# Patient Record
Sex: FEMALE | Race: WHITE | NOT HISPANIC OR LATINO | Employment: UNEMPLOYED | RURAL
[De-identification: names, ages, dates, MRNs, and addresses within clinical notes are randomized per-mention and may not be internally consistent; named-entity substitution may affect disease eponyms.]

---

## 2021-09-08 ENCOUNTER — OFFICE VISIT (OUTPATIENT)
Dept: FAMILY MEDICINE | Facility: CLINIC | Age: 26
End: 2021-09-08
Payer: COMMERCIAL

## 2021-09-08 VITALS
TEMPERATURE: 97 F | SYSTOLIC BLOOD PRESSURE: 104 MMHG | HEART RATE: 61 BPM | OXYGEN SATURATION: 100 % | HEIGHT: 60 IN | WEIGHT: 124 LBS | DIASTOLIC BLOOD PRESSURE: 70 MMHG | BODY MASS INDEX: 24.35 KG/M2

## 2021-09-08 DIAGNOSIS — F32.A DEPRESSION, UNSPECIFIED DEPRESSION TYPE: Primary | ICD-10-CM

## 2021-09-08 DIAGNOSIS — F41.9 ANXIETY: ICD-10-CM

## 2021-09-08 PROCEDURE — 1160F RVW MEDS BY RX/DR IN RCRD: CPT | Mod: ,,, | Performed by: NURSE PRACTITIONER

## 2021-09-08 PROCEDURE — 3074F SYST BP LT 130 MM HG: CPT | Mod: ,,, | Performed by: NURSE PRACTITIONER

## 2021-09-08 PROCEDURE — 3074F PR MOST RECENT SYSTOLIC BLOOD PRESSURE < 130 MM HG: ICD-10-PCS | Mod: ,,, | Performed by: NURSE PRACTITIONER

## 2021-09-08 PROCEDURE — 1159F MED LIST DOCD IN RCRD: CPT | Mod: ,,, | Performed by: NURSE PRACTITIONER

## 2021-09-08 PROCEDURE — 99213 PR OFFICE/OUTPT VISIT, EST, LEVL III, 20-29 MIN: ICD-10-PCS | Mod: ,,, | Performed by: NURSE PRACTITIONER

## 2021-09-08 PROCEDURE — 3008F PR BODY MASS INDEX (BMI) DOCUMENTED: ICD-10-PCS | Mod: ,,, | Performed by: NURSE PRACTITIONER

## 2021-09-08 PROCEDURE — 99213 OFFICE O/P EST LOW 20 MIN: CPT | Mod: ,,, | Performed by: NURSE PRACTITIONER

## 2021-09-08 PROCEDURE — 1159F PR MEDICATION LIST DOCUMENTED IN MEDICAL RECORD: ICD-10-PCS | Mod: ,,, | Performed by: NURSE PRACTITIONER

## 2021-09-08 PROCEDURE — 3078F DIAST BP <80 MM HG: CPT | Mod: ,,, | Performed by: NURSE PRACTITIONER

## 2021-09-08 PROCEDURE — 1160F PR REVIEW ALL MEDS BY PRESCRIBER/CLIN PHARMACIST DOCUMENTED: ICD-10-PCS | Mod: ,,, | Performed by: NURSE PRACTITIONER

## 2021-09-08 PROCEDURE — 3008F BODY MASS INDEX DOCD: CPT | Mod: ,,, | Performed by: NURSE PRACTITIONER

## 2021-09-08 PROCEDURE — 3078F PR MOST RECENT DIASTOLIC BLOOD PRESSURE < 80 MM HG: ICD-10-PCS | Mod: ,,, | Performed by: NURSE PRACTITIONER

## 2021-09-08 RX ORDER — SERTRALINE HYDROCHLORIDE 50 MG/1
50 TABLET, FILM COATED ORAL DAILY
COMMUNITY
Start: 2021-08-11 | End: 2021-09-08 | Stop reason: SDUPTHER

## 2021-09-08 RX ORDER — SERTRALINE HYDROCHLORIDE 50 MG/1
50 TABLET, FILM COATED ORAL DAILY
Qty: 90 TABLET | Refills: 1 | Status: SHIPPED | OUTPATIENT
Start: 2021-09-08 | End: 2022-03-08 | Stop reason: SDUPTHER

## 2021-10-27 ENCOUNTER — OFFICE VISIT (OUTPATIENT)
Dept: FAMILY MEDICINE | Facility: CLINIC | Age: 26
End: 2021-10-27
Payer: COMMERCIAL

## 2021-10-27 VITALS
TEMPERATURE: 97 F | OXYGEN SATURATION: 100 % | WEIGHT: 128 LBS | BODY MASS INDEX: 25.13 KG/M2 | SYSTOLIC BLOOD PRESSURE: 102 MMHG | HEART RATE: 62 BPM | HEIGHT: 60 IN | DIASTOLIC BLOOD PRESSURE: 64 MMHG

## 2021-10-27 DIAGNOSIS — S40.261A: Primary | ICD-10-CM

## 2021-10-27 DIAGNOSIS — L29.9 ITCHING: ICD-10-CM
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
## 2021-10-27 DIAGNOSIS — S40.861A: Primary | ICD-10-CM

## 2021-10-27 DIAGNOSIS — L08.9: Primary | ICD-10-CM

## 2021-10-27 PROCEDURE — 1160F PR REVIEW ALL MEDS BY PRESCRIBER/CLIN PHARMACIST DOCUMENTED: ICD-10-PCS | Mod: ,,, | Performed by: NURSE PRACTITIONER

## 2021-10-27 PROCEDURE — 1159F MED LIST DOCD IN RCRD: CPT | Mod: ,,, | Performed by: NURSE PRACTITIONER

## 2021-10-27 PROCEDURE — 3078F PR MOST RECENT DIASTOLIC BLOOD PRESSURE < 80 MM HG: ICD-10-PCS | Mod: ,,, | Performed by: NURSE PRACTITIONER

## 2021-10-27 PROCEDURE — 3074F SYST BP LT 130 MM HG: CPT | Mod: ,,, | Performed by: NURSE PRACTITIONER

## 2021-10-27 PROCEDURE — 3078F DIAST BP <80 MM HG: CPT | Mod: ,,, | Performed by: NURSE PRACTITIONER

## 2021-10-27 PROCEDURE — 99213 PR OFFICE/OUTPT VISIT, EST, LEVL III, 20-29 MIN: ICD-10-PCS | Mod: ,,, | Performed by: NURSE PRACTITIONER

## 2021-10-27 PROCEDURE — 1159F PR MEDICATION LIST DOCUMENTED IN MEDICAL RECORD: ICD-10-PCS | Mod: ,,, | Performed by: NURSE PRACTITIONER

## 2021-10-27 PROCEDURE — 3008F BODY MASS INDEX DOCD: CPT | Mod: ,,, | Performed by: NURSE PRACTITIONER

## 2021-10-27 PROCEDURE — 3074F PR MOST RECENT SYSTOLIC BLOOD PRESSURE < 130 MM HG: ICD-10-PCS | Mod: ,,, | Performed by: NURSE PRACTITIONER

## 2021-10-27 PROCEDURE — 3008F PR BODY MASS INDEX (BMI) DOCUMENTED: ICD-10-PCS | Mod: ,,, | Performed by: NURSE PRACTITIONER

## 2021-10-27 PROCEDURE — 1160F RVW MEDS BY RX/DR IN RCRD: CPT | Mod: ,,, | Performed by: NURSE PRACTITIONER

## 2021-10-27 PROCEDURE — 99213 OFFICE O/P EST LOW 20 MIN: CPT | Mod: ,,, | Performed by: NURSE PRACTITIONER

## 2021-10-27 RX ORDER — TRIAMCINOLONE ACETONIDE 1 MG/G
CREAM TOPICAL 2 TIMES DAILY
Qty: 15 G | Refills: 0 | Status: SHIPPED | OUTPATIENT
Start: 2021-10-27 | End: 2023-03-09

## 2021-12-28 ENCOUNTER — OFFICE VISIT (OUTPATIENT)
Dept: FAMILY MEDICINE | Facility: CLINIC | Age: 26
End: 2021-12-28
Payer: COMMERCIAL

## 2021-12-28 DIAGNOSIS — J32.9 SINUSITIS, UNSPECIFIED CHRONICITY, UNSPECIFIED LOCATION: Primary | ICD-10-CM

## 2021-12-28 PROCEDURE — 1159F PR MEDICATION LIST DOCUMENTED IN MEDICAL RECORD: ICD-10-PCS | Mod: ,,, | Performed by: NURSE PRACTITIONER

## 2021-12-28 PROCEDURE — 1159F MED LIST DOCD IN RCRD: CPT | Mod: ,,, | Performed by: NURSE PRACTITIONER

## 2021-12-28 PROCEDURE — 99213 OFFICE O/P EST LOW 20 MIN: CPT | Mod: ,,, | Performed by: NURSE PRACTITIONER

## 2021-12-28 PROCEDURE — 99213 PR OFFICE/OUTPT VISIT, EST, LEVL III, 20-29 MIN: ICD-10-PCS | Mod: ,,, | Performed by: NURSE PRACTITIONER

## 2021-12-28 RX ORDER — GUAIFENESIN 600 MG/1
600 TABLET, EXTENDED RELEASE ORAL 2 TIMES DAILY
Qty: 20 TABLET | Refills: 0 | Status: SHIPPED | OUTPATIENT
Start: 2021-12-28 | End: 2022-01-07

## 2021-12-28 RX ORDER — METHYLPREDNISOLONE 4 MG/1
TABLET ORAL
Qty: 21 EACH | Refills: 0 | Status: SHIPPED | OUTPATIENT
Start: 2021-12-28 | End: 2022-01-18

## 2021-12-28 RX ORDER — AZITHROMYCIN 250 MG/1
TABLET, FILM COATED ORAL
Qty: 6 TABLET | Refills: 0 | Status: SHIPPED | OUTPATIENT
Start: 2021-12-28 | End: 2022-01-02

## 2022-02-09 LAB
PAP RECOMMENDATION EXT: NORMAL
PAP SMEAR: NORMAL

## 2022-02-18 ENCOUNTER — OFFICE VISIT (OUTPATIENT)
Dept: FAMILY MEDICINE | Facility: CLINIC | Age: 27
End: 2022-02-18
Payer: COMMERCIAL

## 2022-02-18 VITALS
DIASTOLIC BLOOD PRESSURE: 70 MMHG | RESPIRATION RATE: 16 BRPM | HEART RATE: 84 BPM | WEIGHT: 128 LBS | HEIGHT: 60 IN | BODY MASS INDEX: 25.13 KG/M2 | SYSTOLIC BLOOD PRESSURE: 114 MMHG | TEMPERATURE: 99 F | OXYGEN SATURATION: 99 %

## 2022-02-18 DIAGNOSIS — J32.9 SINUSITIS, UNSPECIFIED CHRONICITY, UNSPECIFIED LOCATION: Primary | ICD-10-CM

## 2022-02-18 PROCEDURE — 3078F DIAST BP <80 MM HG: CPT | Mod: ,,, | Performed by: FAMILY MEDICINE

## 2022-02-18 PROCEDURE — 1159F MED LIST DOCD IN RCRD: CPT | Mod: ,,, | Performed by: FAMILY MEDICINE

## 2022-02-18 PROCEDURE — 1160F PR REVIEW ALL MEDS BY PRESCRIBER/CLIN PHARMACIST DOCUMENTED: ICD-10-PCS | Mod: ,,, | Performed by: FAMILY MEDICINE

## 2022-02-18 PROCEDURE — 3008F PR BODY MASS INDEX (BMI) DOCUMENTED: ICD-10-PCS | Mod: ,,, | Performed by: FAMILY MEDICINE

## 2022-02-18 PROCEDURE — 3074F SYST BP LT 130 MM HG: CPT | Mod: ,,, | Performed by: FAMILY MEDICINE

## 2022-02-18 PROCEDURE — 1160F RVW MEDS BY RX/DR IN RCRD: CPT | Mod: ,,, | Performed by: FAMILY MEDICINE

## 2022-02-18 PROCEDURE — 99213 PR OFFICE/OUTPT VISIT, EST, LEVL III, 20-29 MIN: ICD-10-PCS | Mod: ,,, | Performed by: FAMILY MEDICINE

## 2022-02-18 PROCEDURE — 3078F PR MOST RECENT DIASTOLIC BLOOD PRESSURE < 80 MM HG: ICD-10-PCS | Mod: ,,, | Performed by: FAMILY MEDICINE

## 2022-02-18 PROCEDURE — 3008F BODY MASS INDEX DOCD: CPT | Mod: ,,, | Performed by: FAMILY MEDICINE

## 2022-02-18 PROCEDURE — 1159F PR MEDICATION LIST DOCUMENTED IN MEDICAL RECORD: ICD-10-PCS | Mod: ,,, | Performed by: FAMILY MEDICINE

## 2022-02-18 PROCEDURE — 99213 OFFICE O/P EST LOW 20 MIN: CPT | Mod: ,,, | Performed by: FAMILY MEDICINE

## 2022-02-18 PROCEDURE — 3074F PR MOST RECENT SYSTOLIC BLOOD PRESSURE < 130 MM HG: ICD-10-PCS | Mod: ,,, | Performed by: FAMILY MEDICINE

## 2022-02-18 RX ORDER — CLINDAMYCIN HYDROCHLORIDE 300 MG/1
300 CAPSULE ORAL 2 TIMES DAILY
Qty: 10 CAPSULE | Refills: 0 | Status: SHIPPED | OUTPATIENT
Start: 2022-02-18 | End: 2022-02-23

## 2022-02-18 NOTE — PROGRESS NOTES
Subjective:       Patient ID: Seble Real is a 26 y.o. female.    Chief Complaint: Sinus Problem, Nasal Congestion (Green mucus), Cough, and Chest Congestion (Symptoms x4 days. No testing. Pt 8wk gestation. )    Pt 8 weeks gestation. Reports sinus pressure, congestion, drainage x 3 days. Afebrile.     Review of Systems   Constitutional: Negative for activity change, appetite change, chills, diaphoresis, fatigue, fever and unexpected weight change.   HENT: Positive for nasal congestion, postnasal drip, rhinorrhea, sinus pressure/congestion and sore throat. Negative for dental problem, drooling, ear discharge, ear pain, facial swelling, hearing loss, mouth sores, nosebleeds, sneezing, tinnitus, trouble swallowing, voice change and goiter.    Eyes: Negative for photophobia, discharge, itching and visual disturbance.   Respiratory: Negative for apnea, cough, choking, chest tightness, shortness of breath, wheezing and stridor.    Cardiovascular: Negative for chest pain, palpitations, leg swelling and claudication.   Gastrointestinal: Negative for abdominal distention, abdominal pain, anal bleeding, blood in stool, change in bowel habit, constipation, diarrhea, nausea, vomiting and change in bowel habit.   Endocrine: Negative for cold intolerance, heat intolerance, polydipsia, polyphagia and polyuria.   Genitourinary: Negative for bladder incontinence, decreased urine volume, difficulty urinating, dysuria, enuresis, flank pain, frequency, hematuria, nocturia, pelvic pain and urgency.   Musculoskeletal: Negative for arthralgias, back pain, gait problem, joint swelling, leg pain, myalgias, neck pain, neck stiffness and joint deformity.   Integumentary:  Negative for pallor, rash, wound, breast mass and breast tenderness.   Allergic/Immunologic: Negative for environmental allergies, food allergies and immunocompromised state.   Neurological: Negative for dizziness, vertigo, tremors, seizures, syncope, facial asymmetry,  speech difficulty, weakness, light-headedness, numbness, headaches, disturbances in coordination, memory loss and coordination difficulties.   Hematological: Negative for adenopathy. Does not bruise/bleed easily.   Psychiatric/Behavioral: Negative for agitation, behavioral problems, confusion, decreased concentration, dysphoric mood, hallucinations, self-injury, sleep disturbance and suicidal ideas. The patient is not nervous/anxious and is not hyperactive.    Breast: Negative for mass and tenderness        Objective:      Physical Exam  Vitals reviewed.   Constitutional:       Appearance: Normal appearance.   HENT:      Head: Normocephalic and atraumatic.      Right Ear: Tympanic membrane, ear canal and external ear normal.      Left Ear: Tympanic membrane, ear canal and external ear normal.      Nose: Congestion and rhinorrhea present.      Comments: Bilateral sinus pressure.      Mouth/Throat:      Mouth: Mucous membranes are moist.      Pharynx: Oropharynx is clear. Posterior oropharyngeal erythema present.   Eyes:      Extraocular Movements: Extraocular movements intact.      Conjunctiva/sclera: Conjunctivae normal.      Pupils: Pupils are equal, round, and reactive to light.   Cardiovascular:      Rate and Rhythm: Normal rate and regular rhythm.      Pulses: Normal pulses.      Heart sounds: Normal heart sounds.   Pulmonary:      Effort: Pulmonary effort is normal.      Breath sounds: Normal breath sounds.   Abdominal:      General: Bowel sounds are normal.      Palpations: Abdomen is soft.   Musculoskeletal:         General: Normal range of motion.      Cervical back: Normal range of motion and neck supple.   Skin:     General: Skin is warm and dry.   Neurological:      General: No focal deficit present.      Mental Status: She is alert. Mental status is at baseline.   Psychiatric:         Mood and Affect: Mood normal.         Behavior: Behavior normal.         Thought Content: Thought content normal.          Judgment: Judgment normal.         Assessment:       1. Sinusitis, unspecified chronicity, unspecified location        Plan:     Sinusitis, unspecified chronicity, unspecified location    Other orders  -     clindamycin (CLEOCIN) 300 MG capsule; Take 1 capsule (300 mg total) by mouth 2 (two) times a day. for 5 days  Dispense: 10 capsule; Refill: 0         Treat for sinusitis, f/u if no improvement.

## 2022-03-08 ENCOUNTER — OFFICE VISIT (OUTPATIENT)
Dept: FAMILY MEDICINE | Facility: CLINIC | Age: 27
End: 2022-03-08
Payer: COMMERCIAL

## 2022-03-08 VITALS
BODY MASS INDEX: 25.72 KG/M2 | WEIGHT: 131 LBS | HEART RATE: 62 BPM | SYSTOLIC BLOOD PRESSURE: 110 MMHG | HEIGHT: 60 IN | DIASTOLIC BLOOD PRESSURE: 70 MMHG | OXYGEN SATURATION: 100 % | TEMPERATURE: 97 F

## 2022-03-08 DIAGNOSIS — Z13.1 SCREENING FOR DIABETES MELLITUS: ICD-10-CM

## 2022-03-08 DIAGNOSIS — F32.A DEPRESSION, UNSPECIFIED DEPRESSION TYPE: ICD-10-CM

## 2022-03-08 DIAGNOSIS — Z13.220 SCREENING FOR HYPERLIPIDEMIA: ICD-10-CM

## 2022-03-08 DIAGNOSIS — Z3A.11 11 WEEKS GESTATION OF PREGNANCY: ICD-10-CM

## 2022-03-08 DIAGNOSIS — F41.9 ANXIETY: ICD-10-CM

## 2022-03-08 DIAGNOSIS — Z00.00 ROUTINE GENERAL MEDICAL EXAMINATION AT A HEALTH CARE FACILITY: Primary | ICD-10-CM

## 2022-03-08 LAB
CHOLEST SERPL-MCNC: 151 MG/DL (ref 0–200)
CHOLEST/HDLC SERPL: 3.2 {RATIO}
GLUCOSE SERPL-MCNC: 91 MG/DL (ref 74–106)
HDLC SERPL-MCNC: 47 MG/DL (ref 40–60)
LDLC SERPL CALC-MCNC: 89 MG/DL
LDLC/HDLC SERPL: 1.9 {RATIO}
NONHDLC SERPL-MCNC: 104 MG/DL
TRIGL SERPL-MCNC: 75 MG/DL (ref 35–150)
VLDLC SERPL-MCNC: 15 MG/DL

## 2022-03-08 PROCEDURE — 3008F BODY MASS INDEX DOCD: CPT | Mod: ,,, | Performed by: NURSE PRACTITIONER

## 2022-03-08 PROCEDURE — 3074F PR MOST RECENT SYSTOLIC BLOOD PRESSURE < 130 MM HG: ICD-10-PCS | Mod: ,,, | Performed by: NURSE PRACTITIONER

## 2022-03-08 PROCEDURE — 1159F PR MEDICATION LIST DOCUMENTED IN MEDICAL RECORD: ICD-10-PCS | Mod: ,,, | Performed by: NURSE PRACTITIONER

## 2022-03-08 PROCEDURE — 80061 LIPID PANEL: ICD-10-PCS | Mod: ,,, | Performed by: CLINICAL MEDICAL LABORATORY

## 2022-03-08 PROCEDURE — 3078F DIAST BP <80 MM HG: CPT | Mod: ,,, | Performed by: NURSE PRACTITIONER

## 2022-03-08 PROCEDURE — 3078F PR MOST RECENT DIASTOLIC BLOOD PRESSURE < 80 MM HG: ICD-10-PCS | Mod: ,,, | Performed by: NURSE PRACTITIONER

## 2022-03-08 PROCEDURE — 3074F SYST BP LT 130 MM HG: CPT | Mod: ,,, | Performed by: NURSE PRACTITIONER

## 2022-03-08 PROCEDURE — 99395 PREV VISIT EST AGE 18-39: CPT | Mod: ,,, | Performed by: NURSE PRACTITIONER

## 2022-03-08 PROCEDURE — 80061 LIPID PANEL: CPT | Mod: ,,, | Performed by: CLINICAL MEDICAL LABORATORY

## 2022-03-08 PROCEDURE — 99395 PR PREVENTIVE VISIT,EST,18-39: ICD-10-PCS | Mod: ,,, | Performed by: NURSE PRACTITIONER

## 2022-03-08 PROCEDURE — 1160F PR REVIEW ALL MEDS BY PRESCRIBER/CLIN PHARMACIST DOCUMENTED: ICD-10-PCS | Mod: ,,, | Performed by: NURSE PRACTITIONER

## 2022-03-08 PROCEDURE — 3008F PR BODY MASS INDEX (BMI) DOCUMENTED: ICD-10-PCS | Mod: ,,, | Performed by: NURSE PRACTITIONER

## 2022-03-08 PROCEDURE — 1160F RVW MEDS BY RX/DR IN RCRD: CPT | Mod: ,,, | Performed by: NURSE PRACTITIONER

## 2022-03-08 PROCEDURE — 82947 ASSAY GLUCOSE BLOOD QUANT: CPT | Mod: ,,, | Performed by: CLINICAL MEDICAL LABORATORY

## 2022-03-08 PROCEDURE — 1159F MED LIST DOCD IN RCRD: CPT | Mod: ,,, | Performed by: NURSE PRACTITIONER

## 2022-03-08 PROCEDURE — 82947 GLUCOSE, RANDOM: ICD-10-PCS | Mod: ,,, | Performed by: CLINICAL MEDICAL LABORATORY

## 2022-03-08 RX ORDER — SERTRALINE HYDROCHLORIDE 50 MG/1
50 TABLET, FILM COATED ORAL DAILY
Qty: 90 TABLET | Refills: 0 | Status: SHIPPED | OUTPATIENT
Start: 2022-03-08 | End: 2022-12-28 | Stop reason: SDUPTHER

## 2022-03-08 NOTE — PATIENT INSTRUCTIONS
Lab obtained in clinic today, we will notify you of results and any necessary changes to plan of care     Discuss continuation of Zoloft during pregnancy with OB provider

## 2022-03-08 NOTE — PROGRESS NOTES
Clinic note     Patient name: Seble Real is a 26 y.o. female   Chief compliant   Chief Complaint   Patient presents with    Medication Refill     No problems with anything.        Subjective     History of present illness   In clinic for [employer] [facility] visit , requesting medication refills   Denies any acute concerns at present   She is currently 11 weeks pregnant, followed by Dr Gonzalez with next appointment scheduled for March 30  Discussed continuation of Zoloft during pregnancy, she states she has discussed this with her previous OB Dr Shankar and will also discuss this with Dr Gonzalez at next follow up visit           Social History     Tobacco Use    Smoking status: Current Every Day Smoker     Types: Vaping with nicotine    Smokeless tobacco: Never Used   Substance Use Topics    Alcohol use: Never    Drug use: Never       Review of patient's allergies indicates:   Allergen Reactions    Cefprozil        Past Medical History:   Diagnosis Date    Anxiety     Depression        History reviewed. No pertinent surgical history.     History reviewed. No pertinent family history.      Current Outpatient Medications:     prenatal no122/iron/folic acid (PRENATAL MULTI ORAL), Take by mouth., Disp: , Rfl:     sertraline (ZOLOFT) 50 MG tablet, Take 1 tablet (50 mg total) by mouth once daily., Disp: 90 tablet, Rfl: 0    triamcinolone acetonide 0.1% (KENALOG) 0.1 % cream, Apply topically 2 (two) times daily. for 7 days, Disp: 15 g, Rfl: 0    Review of Systems   Constitutional: Negative for appetite change, chills, fatigue, fever and unexpected weight change.   Eyes: Negative for visual disturbance.   Respiratory: Negative for cough and shortness of breath.    Cardiovascular: Negative for chest pain, palpitations and leg swelling.   Gastrointestinal: Negative for abdominal pain, change in bowel habit, constipation, diarrhea, nausea, vomiting and change in bowel habit.   Endocrine: Negative for polydipsia and  polyuria.   Genitourinary: Negative for dysuria and vaginal bleeding.        11 weeks pregnant    Musculoskeletal: Negative for arthralgias, gait problem and myalgias.   Integumentary:  Negative for wound.   Neurological: Negative for dizziness, syncope, light-headedness and headaches.   Psychiatric/Behavioral: Negative for confusion, dysphoric mood and sleep disturbance. The patient is not nervous/anxious.        Objective     /70   Pulse 62   Temp 97 °F (36.1 °C)   Ht 5' (1.524 m)   Wt 59.4 kg (131 lb)   LMP 12/11/2021   SpO2 100%   BMI 25.58 kg/m²     Physical Exam   Constitutional: She is oriented to person, place, and time. No distress.   HENT:   Head: Normocephalic and atraumatic.   Mouth/Throat: Mucous membranes are moist.   Eyes: Pupils are equal, round, and reactive to light. Conjunctivae are normal.   Cardiovascular: Normal rate and regular rhythm.   Pulmonary/Chest: Effort normal and breath sounds normal. No respiratory distress. She has no wheezes. She has no rhonchi. She has no rales.   Abdominal: Soft. Bowel sounds are normal. She exhibits no distension. There is no abdominal tenderness.   Musculoskeletal:         General: Normal range of motion.      Cervical back: Normal range of motion and neck supple.      Right lower leg: No edema.      Left lower leg: No edema.   Neurological: She is alert and oriented to person, place, and time. Gait normal.   Skin: Skin is warm and dry.   Psychiatric: Her behavior is normal. Mood normal.       No results found for: WBC, HGB, HCT, MCV, PLT    CMP  No results found for: NA, K, CL, CO2, GLU, BUN, CREATININE, CALCIUM, PROT, ALBUMIN, BILITOT, ALKPHOS, AST, ALT, ANIONGAP, ESTGFRAFRICA, EGFRNONAA  No results found for: TSH  No results found for: CHOL  No results found for: HDL  No results found for: LDLCALC  No results found for: TRIG  No results found for: CHOLHDL  No results found for: LABA1C, HGBA1C      Assessment and Plan   Routine general medical  examination at a health care facility    Screening for diabetes mellitus  -     Glucose, Random; Future; Expected date: 03/08/2022    Screening for hyperlipidemia  -     Lipid Panel; Future; Expected date: 03/08/2022    Depression, unspecified depression type  -     sertraline (ZOLOFT) 50 MG tablet; Take 1 tablet (50 mg total) by mouth once daily.  Dispense: 90 tablet; Refill: 0    Anxiety  -     sertraline (ZOLOFT) 50 MG tablet; Take 1 tablet (50 mg total) by mouth once daily.  Dispense: 90 tablet; Refill: 0    11 weeks gestation of pregnancy          Patient Instructions  Patient Instructions   Lab obtained in clinic today, we will notify you of results and any necessary changes to plan of care     Discuss continuation of Zoloft during pregnancy with OB provider

## 2022-05-06 ENCOUNTER — HOSPITAL ENCOUNTER (EMERGENCY)
Facility: HOSPITAL | Age: 27
Discharge: HOME | End: 2022-05-06
Payer: COMMERCIAL

## 2022-05-06 VITALS
BODY MASS INDEX: 26.77 KG/M2 | HEART RATE: 70 BPM | RESPIRATION RATE: 18 BRPM | TEMPERATURE: 98 F | WEIGHT: 136.38 LBS | OXYGEN SATURATION: 100 % | DIASTOLIC BLOOD PRESSURE: 64 MMHG | HEIGHT: 60 IN | SYSTOLIC BLOOD PRESSURE: 106 MMHG

## 2022-05-06 DIAGNOSIS — F32.A DEPRESSION: ICD-10-CM

## 2022-05-06 DIAGNOSIS — L30.9 DERMATITIS: Primary | ICD-10-CM

## 2022-05-06 DIAGNOSIS — F41.9 ANXIETY: ICD-10-CM

## 2022-05-06 DIAGNOSIS — Z3A.11 11 WEEKS GESTATION OF PREGNANCY: ICD-10-CM

## 2022-05-06 PROCEDURE — 99282 EMERGENCY DEPT VISIT SF MDM: CPT | Mod: ,,, | Performed by: NURSE PRACTITIONER

## 2022-05-06 PROCEDURE — 99281 EMR DPT VST MAYX REQ PHY/QHP: CPT | Performed by: NURSE PRACTITIONER

## 2022-05-06 PROCEDURE — 99282 PR EMERGENCY DEPT VISIT,LEVEL II: ICD-10-PCS | Mod: ,,, | Performed by: NURSE PRACTITIONER

## 2022-05-07 NOTE — ED PROVIDER NOTES
Encounter Date: 5/6/2022       History     Chief Complaint   Patient presents with    Rash     Rash to bilateral lower extremities, stomach. Started yesterday.     25 y/o female 20 weeks pregnant presents c/o pruritic rash that started a couple of days ago on left hip. She has been outside in the heat and says she sweats at night. Denies fever.        Review of patient's allergies indicates:   Allergen Reactions    Cefprozil      Past Medical History:   Diagnosis Date    Anxiety     Depression      No past surgical history on file.  No family history on file.  Social History     Tobacco Use    Smoking status: Current Every Day Smoker     Types: Vaping with nicotine    Smokeless tobacco: Never Used   Substance Use Topics    Alcohol use: Never    Drug use: Never     Review of Systems   Constitutional: Negative for activity change, appetite change and fever.   Respiratory: Negative for cough and shortness of breath.    Cardiovascular: Negative for chest pain and palpitations.   Gastrointestinal: Negative for abdominal pain, diarrhea and vomiting.   Genitourinary: Negative for dysuria, frequency and urgency.   Skin: Positive for rash.   Psychiatric/Behavioral: Negative for confusion.   All other systems reviewed and are negative.      Physical Exam     Initial Vitals [05/06/22 2011]   BP Pulse Resp Temp SpO2   106/64 70 18 98.3 °F (36.8 °C) 100 %      MAP       --         Physical Exam    Nursing note and vitals reviewed.  Constitutional: She appears well-developed and well-nourished. No distress.   HENT:   Head: Normocephalic.   Eyes: EOM are normal. Pupils are equal, round, and reactive to light.   Neck: Neck supple.   Normal range of motion.  Cardiovascular: Normal rate and regular rhythm.   Pulmonary/Chest: Breath sounds normal. No respiratory distress. She has no wheezes.   Abdominal: Abdomen is soft. Bowel sounds are normal. There is no abdominal tenderness.   Musculoskeletal:      Cervical back: Normal  range of motion and neck supple.     Neurological: She is alert and oriented to person, place, and time. She has normal strength. GCS score is 15. GCS eye subscore is 4. GCS verbal subscore is 5. GCS motor subscore is 6.   Skin: Skin is warm and dry. Capillary refill takes less than 2 seconds. Rash noted.   Psychiatric: She has a normal mood and affect.         Medical Screening Exam   See Full Note    ED Course   Procedures  Labs Reviewed - No data to display       Imaging Results    None          Medications - No data to display  Medical Decision Making:   ED Management:  Rash consistent with heat rash/dermatitis. Recommend cortisone cream but only thin layer 1-2 times per day. Pt has been wearing tight spandex leggins and sweating. Instructed her to leave area open to air as much as possible and wear loose fitting clothes. Instructed her to call OB-Gyn Monday morning if no better. Instructed her on strict return to ED precautions. She verbalized understanding and is in agreement.                   Clinical Impression:   Final diagnoses:  [L30.9] Dermatitis (Primary)          ED Disposition Condition    Discharge Stable        ED Prescriptions     None        Follow-up Information    None          MARCIN Coronel  05/06/22 1759

## 2022-05-07 NOTE — DISCHARGE INSTRUCTIONS
Apply cortisone cream once daily to affected area.  Wear loose clothing and let area air out.  Follow up with your OB-Gyn.  Return to Emergency Department for any new or worsening symptoms.

## 2022-07-11 ENCOUNTER — HOSPITAL ENCOUNTER (OUTPATIENT)
Facility: HOSPITAL | Age: 27
Discharge: HOME | End: 2022-07-11
Attending: SPECIALIST | Admitting: SPECIALIST
Payer: COMMERCIAL

## 2022-07-11 VITALS
TEMPERATURE: 98 F | HEART RATE: 65 BPM | RESPIRATION RATE: 18 BRPM | DIASTOLIC BLOOD PRESSURE: 64 MMHG | OXYGEN SATURATION: 99 % | BODY MASS INDEX: 28.93 KG/M2 | HEIGHT: 60 IN | WEIGHT: 147.38 LBS | SYSTOLIC BLOOD PRESSURE: 106 MMHG

## 2022-07-11 DIAGNOSIS — Z34.90 PREGNANCY: ICD-10-CM

## 2022-07-11 LAB
BACTERIA #/AREA URNS HPF: NORMAL /HPF
BILIRUB UR QL STRIP: NEGATIVE
CLARITY UR: CLEAR
COLOR UR: YELLOW
GLUCOSE UR STRIP-MCNC: NEGATIVE MG/DL
KETONES UR STRIP-SCNC: NEGATIVE MG/DL
LEUKOCYTE ESTERASE UR QL STRIP: NEGATIVE
NITRITE UR QL STRIP: NEGATIVE
PH UR STRIP: 7 PH UNITS
PROT UR QL STRIP: ABNORMAL
RBC # UR STRIP: ABNORMAL /UL
RBC #/AREA URNS HPF: NORMAL /HPF
SP GR UR STRIP: 1.01
SQUAMOUS #/AREA URNS LPF: NORMAL /LPF
UROBILINOGEN UR STRIP-ACNC: 0.2 MG/DL
WBC #/AREA URNS HPF: NORMAL /HPF

## 2022-07-11 PROCEDURE — 81001 URINALYSIS AUTO W/SCOPE: CPT | Performed by: SPECIALIST

## 2022-07-11 PROCEDURE — 99211 OFF/OP EST MAY X REQ PHY/QHP: CPT

## 2022-08-04 NOTE — PROGRESS NOTES
Patient was seen and evaluated by triage nurse and consequently discharged after report given to me

## 2022-08-05 PROBLEM — Z34.90 PREGNANCY: Status: ACTIVE | Noted: 2022-08-05

## 2022-08-05 NOTE — PROGRESS NOTES
Patient presented in labor department at 39 weeks 0 days, was seen and evaluated by the triage nurse and report given to me and disposition.  Made

## 2022-08-05 NOTE — PROGRESS NOTES
Patient was seen in Labor and delivery at 32 weeks and 2 days.  She was evaluated by the triage nurse and report given and disposition may

## 2022-12-18 ENCOUNTER — HOSPITAL ENCOUNTER (EMERGENCY)
Facility: HOSPITAL | Age: 27
Discharge: HOME | End: 2022-12-18
Payer: COMMERCIAL

## 2022-12-18 VITALS
DIASTOLIC BLOOD PRESSURE: 90 MMHG | TEMPERATURE: 99 F | HEART RATE: 58 BPM | OXYGEN SATURATION: 99 % | SYSTOLIC BLOOD PRESSURE: 145 MMHG | WEIGHT: 121 LBS | BODY MASS INDEX: 23.75 KG/M2 | RESPIRATION RATE: 16 BRPM | HEIGHT: 60 IN

## 2022-12-18 DIAGNOSIS — K59.00 CONSTIPATION, UNSPECIFIED CONSTIPATION TYPE: Primary | ICD-10-CM

## 2022-12-18 LAB
ALBUMIN SERPL BCP-MCNC: 4.1 G/DL (ref 3.5–5)
ALBUMIN/GLOB SERPL: 1.2 {RATIO}
ALP SERPL-CCNC: 81 U/L (ref 37–98)
ALT SERPL W P-5'-P-CCNC: 14 U/L (ref 13–56)
ANION GAP SERPL CALCULATED.3IONS-SCNC: 15 MMOL/L (ref 7–16)
ANISOCYTOSIS BLD QL SMEAR: ABNORMAL
AST SERPL W P-5'-P-CCNC: 8 U/L (ref 15–37)
BASOPHILS # BLD AUTO: 0.01 K/UL (ref 0–0.2)
BASOPHILS NFR BLD AUTO: 0.3 % (ref 0–1)
BILIRUB SERPL-MCNC: 0.7 MG/DL (ref ?–1.2)
BILIRUB UR QL STRIP: ABNORMAL
BUN SERPL-MCNC: 16 MG/DL (ref 7–18)
BUN/CREAT SERPL: 22 (ref 6–20)
CALCIUM SERPL-MCNC: 8.3 MG/DL (ref 8.5–10.1)
CHLORIDE SERPL-SCNC: 102 MMOL/L (ref 98–107)
CLARITY UR: ABNORMAL
CO2 SERPL-SCNC: 28 MMOL/L (ref 21–32)
COLOR UR: ABNORMAL
CREAT SERPL-MCNC: 0.74 MG/DL (ref 0.55–1.02)
DIFFERENTIAL METHOD BLD: ABNORMAL
EGFR (NO RACE VARIABLE) (RUSH/TITUS): 113 ML/MIN/1.73M²
EOSINOPHIL # BLD AUTO: 0.06 K/UL (ref 0–0.5)
EOSINOPHIL NFR BLD AUTO: 1.7 % (ref 1–4)
ERYTHROCYTE [DISTWIDTH] IN BLOOD BY AUTOMATED COUNT: 15.7 % (ref 11.5–14.5)
GLOBULIN SER-MCNC: 3.4 G/DL (ref 2–4)
GLUCOSE SERPL-MCNC: 90 MG/DL (ref 74–106)
GLUCOSE UR STRIP-MCNC: NEGATIVE MG/DL
HCG UR QL IA.RAPID: NEGATIVE
HCT VFR BLD AUTO: 38.7 % (ref 38–47)
HGB BLD-MCNC: 12.3 G/DL (ref 12–16)
KETONES UR STRIP-SCNC: 80 MG/DL
LEUKOCYTE ESTERASE UR QL STRIP: NEGATIVE
LIPASE SERPL-CCNC: 88 U/L (ref 73–393)
LYMPHOCYTES # BLD AUTO: 0.63 K/UL (ref 1–4.8)
LYMPHOCYTES NFR BLD AUTO: 18.2 % (ref 27–41)
LYMPHOCYTES NFR BLD MANUAL: 30 % (ref 27–41)
MCH RBC QN AUTO: 22.6 PG (ref 27–31)
MCHC RBC AUTO-ENTMCNC: 31.8 G/DL (ref 32–36)
MCV RBC AUTO: 71 FL (ref 80–96)
MICROCYTES BLD QL SMEAR: ABNORMAL
MONOCYTES # BLD AUTO: 0.36 K/UL (ref 0–0.8)
MONOCYTES NFR BLD AUTO: 10.4 % (ref 2–6)
MONOCYTES NFR BLD MANUAL: 5 % (ref 2–6)
MPC BLD CALC-MCNC: 11.9 FL (ref 9.4–12.4)
NEUTROPHILS # BLD AUTO: 2.4 K/UL (ref 1.8–7.7)
NEUTROPHILS NFR BLD AUTO: 69.4 % (ref 53–65)
NEUTS SEG NFR BLD MANUAL: 65 % (ref 50–62)
NITRITE UR QL STRIP: NEGATIVE
NRBC BLD MANUAL-RTO: ABNORMAL %
PH UR STRIP: 6 PH UNITS
PLATELET # BLD AUTO: 177 K/UL (ref 150–400)
PLATELET MORPHOLOGY: NORMAL
POTASSIUM SERPL-SCNC: 4.2 MMOL/L (ref 3.5–5.1)
PROT SERPL-MCNC: 7.5 G/DL (ref 6.4–8.2)
PROT UR QL STRIP: NEGATIVE
RBC # BLD AUTO: 5.45 M/UL (ref 4.2–5.4)
RBC # UR STRIP: NEGATIVE /UL
SODIUM SERPL-SCNC: 141 MMOL/L (ref 136–145)
SP GR UR STRIP: >=1.03
UROBILINOGEN UR STRIP-ACNC: 0.2 MG/DL
WBC # BLD AUTO: 3.46 K/UL (ref 4.5–11)

## 2022-12-18 PROCEDURE — 81025 URINE PREGNANCY TEST: CPT | Performed by: NURSE PRACTITIONER

## 2022-12-18 PROCEDURE — 96361 HYDRATE IV INFUSION ADD-ON: CPT

## 2022-12-18 PROCEDURE — 99284 PR EMERGENCY DEPT VISIT,LEVEL IV: ICD-10-PCS | Mod: ,,, | Performed by: NURSE PRACTITIONER

## 2022-12-18 PROCEDURE — 99285 EMERGENCY DEPT VISIT HI MDM: CPT | Mod: 25

## 2022-12-18 PROCEDURE — 81003 URINALYSIS AUTO W/O SCOPE: CPT | Performed by: NURSE PRACTITIONER

## 2022-12-18 PROCEDURE — 80053 COMPREHEN METABOLIC PANEL: CPT | Performed by: NURSE PRACTITIONER

## 2022-12-18 PROCEDURE — 96374 THER/PROPH/DIAG INJ IV PUSH: CPT

## 2022-12-18 PROCEDURE — 83690 ASSAY OF LIPASE: CPT | Performed by: NURSE PRACTITIONER

## 2022-12-18 PROCEDURE — 99284 EMERGENCY DEPT VISIT MOD MDM: CPT | Mod: ,,, | Performed by: NURSE PRACTITIONER

## 2022-12-18 PROCEDURE — 63600175 PHARM REV CODE 636 W HCPCS: Performed by: NURSE PRACTITIONER

## 2022-12-18 PROCEDURE — 85025 COMPLETE CBC W/AUTO DIFF WBC: CPT | Performed by: NURSE PRACTITIONER

## 2022-12-18 PROCEDURE — 25000003 PHARM REV CODE 250: Performed by: NURSE PRACTITIONER

## 2022-12-18 PROCEDURE — 25500020 PHARM REV CODE 255: Performed by: NURSE PRACTITIONER

## 2022-12-18 RX ORDER — POLYETHYLENE GLYCOL 3350 17 G/17G
17 POWDER, FOR SOLUTION ORAL DAILY
Qty: 510 G | Refills: 0 | Status: SHIPPED | OUTPATIENT
Start: 2022-12-18 | End: 2023-01-17

## 2022-12-18 RX ORDER — ONDANSETRON 2 MG/ML
4 INJECTION INTRAMUSCULAR; INTRAVENOUS
Status: COMPLETED | OUTPATIENT
Start: 2022-12-18 | End: 2022-12-18

## 2022-12-18 RX ADMIN — ONDANSETRON 4 MG: 2 INJECTION INTRAMUSCULAR; INTRAVENOUS at 08:12

## 2022-12-18 RX ADMIN — IOPAMIDOL 100 ML: 755 INJECTION, SOLUTION INTRAVENOUS at 09:12

## 2022-12-18 RX ADMIN — SODIUM CHLORIDE 1000 ML: 9 INJECTION, SOLUTION INTRAVENOUS at 08:12

## 2022-12-18 NOTE — ED PROVIDER NOTES
Encounter Date: 12/18/2022       History     Chief Complaint   Patient presents with    Abdominal Pain     C/o intermittent abd pain yesterday also reports fever and one episode of vomiting yesterday     Patient presents to the ED with complaints of generalized abdominal pain, reports she had an episode of fever yesterday of 99.4, one episode of vomiting and diarrhea yesterday. Patient states she is able to eat and drink but has noticed a decreased appetite. Reports she has had 3 C-Sections, most recent one was 3 months ago. No other abdominal history.    The history is provided by the patient.   Review of patient's allergies indicates:   Allergen Reactions    Cefprozil      Past Medical History:   Diagnosis Date    Anxiety     Depression      History reviewed. No pertinent surgical history.  History reviewed. No pertinent family history.  Social History     Tobacco Use    Smoking status: Every Day     Types: Vaping with nicotine    Smokeless tobacco: Never   Substance Use Topics    Alcohol use: Never    Drug use: Never     Review of Systems   Constitutional: Negative.    Respiratory: Negative.     Cardiovascular: Negative.    Gastrointestinal:  Positive for abdominal pain, diarrhea, nausea and vomiting. Negative for constipation.   Genitourinary: Negative.    Skin: Negative.    Neurological: Negative.    Psychiatric/Behavioral: Negative.     All other systems reviewed and are negative.    Physical Exam     Initial Vitals [12/18/22 0815]   BP Pulse Resp Temp SpO2   (!) 144/99 62 18 98.7 °F (37.1 °C) 98 %      MAP       --         Physical Exam    Vitals reviewed.  Constitutional: She appears well-developed and well-nourished.   Cardiovascular:  Normal rate, regular rhythm, normal heart sounds and intact distal pulses.           Pulmonary/Chest: Breath sounds normal.   Abdominal: Abdomen is soft. Bowel sounds are normal. There is abdominal tenderness (generalized).   Musculoskeletal:         General: Normal range of  motion.     Neurological: She is alert and oriented to person, place, and time. She has normal strength.   Skin: Skin is warm and dry. Capillary refill takes less than 2 seconds.   Psychiatric: She has a normal mood and affect. Her behavior is normal. Judgment and thought content normal.       Medical Screening Exam   See Full Note    ED Course   Procedures  Labs Reviewed   URINALYSIS, REFLEX TO URINE CULTURE - Abnormal; Notable for the following components:       Result Value    Ketones, UA 80 (*)     Bilirubin, UA Small (*)     Specific Gravity, UA >=1.030 (*)     All other components within normal limits   COMPREHENSIVE METABOLIC PANEL - Abnormal; Notable for the following components:    BUN/Creatinine Ratio 22 (*)     Calcium 8.3 (*)     AST 8 (*)     All other components within normal limits   CBC WITH DIFFERENTIAL - Abnormal; Notable for the following components:    WBC 3.46 (*)     RBC 5.45 (*)     MCV 71.0 (*)     MCH 22.6 (*)     MCHC 31.8 (*)     RDW 15.7 (*)     Neutrophils % 69.4 (*)     Lymphocytes % 18.2 (*)     Lymphocytes, Absolute 0.63 (*)     Monocytes % 10.4 (*)     All other components within normal limits   MANUAL DIFFERENTIAL - Abnormal; Notable for the following components:    Segmented Neutrophils, Man % 65 (*)     All other components within normal limits   HCG QUALITATIVE URINE - Normal   LIPASE - Normal   CBC W/ AUTO DIFFERENTIAL    Narrative:     The following orders were created for panel order CBC auto differential.  Procedure                               Abnormality         Status                     ---------                               -----------         ------                     CBC with Differential[[id number]]        Abnormal            Final result               Manual Differential[[id number]]          Abnormal            Final result                 Please view results for these tests on the individual orders.          Imaging Results              CT Abdomen Pelvis With  Contrast (Final result)  Result time 12/18/22 11:03:28      Final result by Elie Lara MD (12/18/22 11:03:28)                   Impression:      1. No evidence to suggest acute pathology within the abdomen or pelvis.    2.  Normal appendix.  Mild fecal stasis/constipation changes are suggested.    Place of service: [facility]      Electronically signed by: Elie Lara  Date:    12/18/2022  Time:    11:03               Narrative:    EXAMINATION  CT ABDOMEN PELVIS WITH CONTRAST    CLINICAL HISTORY:  Abdominal pain, acute, nonlocalized;    TECHNIQUE:  3 mm axial images were obtained from the lung bases through the ischial tuberosities status post administration of 100 cc of Isovue-370. No immediate complication from administration of contrast media. Coronal and sagittal reformatted images were additionally created and submitted for review. Total DLP: 1611 mGy/cm. Dose reduction:    The CT exam was performed using one or more dose reduction techniques: Automatic exposure control, automated adjustment of the MA and/or kVP according to patient size, or use of iterative reconstruction technique.    COMPARISON:  None available    FINDINGS:  Lung bases: Lung bases are predominantly clear.  No significant pleural/pericardial effusion.    Abdomen:    Liver and Gallbladder: No abnormal enhancement.  No biliary ductal dilatation or gallstones.  Portal vein is patent.    Spleen: Unremarkable    Pancreas: Unremarkable    Adrenals: Unremarkable    Kidneys: Both kidneys are equally perfused and demonstrate no evidence for obstructive uropathy.   Grossly symmetric excretion of contrast is noted on delayed phase images.    Bowel and Mesentery: Small bowel is nondilated.  Scattered fecal material is noted throughout the colon which is otherwise grossly unremarkable in course/caliber.  The appendix is normal.  No secondary signs of acute appendicitis.  No significant mesenteric adenopathy.  No free fluid/air within the  abdomen or pelvis.    Retroperitoneum: No enlarged lymph nodes.    Pelvis:    Bladder: No focal abnormality.    Fluid: No free fluid identified.    Lymph nodes: No enlarged lymph nodes.    Pelvic organs: Unremarkable    Osseous structures: No suspicious appearing osseous abnormalities noted.                                       Medications   ondansetron injection 4 mg (4 mg Intravenous Given 12/18/22 0836)   sodium chloride 0.9% bolus 1,000 mL 1,000 mL (0 mLs Intravenous Stopped 12/18/22 0956)   iopamidoL (ISOVUE-370) injection 100 mL (100 mLs Intravenous Given 12/18/22 0939)     Medical Decision Making:   Initial Assessment:   Patient appears mildly uncomfortable, awake and alert, afebrile.  Clinical Tests:   Lab Tests: Ordered and Reviewed  The following lab test(s) were unremarkable: CBC, CMP, Urinalysis, UPT and Lipase       <> Summary of Lab: Labs unremarkable. Urinalysis negative for infection, negative UPT.  Radiological Study: Ordered and Reviewed  ED Management:  0913 - Labs reviewed along with urinalysis. Patient reports her nausea is better but she still has generalized abdominal pain. Spoke with [facility], Dr. Mckeon agrees with CT scan of abdomen and pelvis with contrast.   1109 - CT showed constipation, will discharge home with Miralax, [facility] consult completed with Dr. Mckeon, she agreed with treatment plan.                  Clinical Impression:   Final diagnoses:  [K59.00] Constipation, unspecified constipation type (Primary)        ED Disposition Condition    Discharge Stable          ED Prescriptions       Medication Sig Dispense Start Date End Date Auth. Provider    polyethylene glycol (GLYCOLAX) 17 gram/dose powder Take 17 g by mouth once daily. 510 g 12/18/2022 1/17/2023 MARCIN Dasilva          Follow-up Information       Follow up With Specialties Details Why Contact Info    [facility] - Emergency Department Emergency Medicine  As needed, If symptoms worsen [address] [facility]  [facility] [id number]  [phone]             Shayy Christine, [facility]  12/18/22 1119

## 2022-12-28 ENCOUNTER — OFFICE VISIT (OUTPATIENT)
Dept: FAMILY MEDICINE | Facility: CLINIC | Age: 27
End: 2022-12-28
Payer: COMMERCIAL

## 2022-12-28 VITALS
WEIGHT: 127 LBS | SYSTOLIC BLOOD PRESSURE: 128 MMHG | HEIGHT: 60 IN | BODY MASS INDEX: 24.94 KG/M2 | OXYGEN SATURATION: 98 % | HEART RATE: 55 BPM | DIASTOLIC BLOOD PRESSURE: 90 MMHG

## 2022-12-28 DIAGNOSIS — F32.A DEPRESSION, UNSPECIFIED DEPRESSION TYPE: Primary | ICD-10-CM

## 2022-12-28 DIAGNOSIS — F41.9 ANXIETY: ICD-10-CM

## 2022-12-28 DIAGNOSIS — F17.200 CURRENT EVERY DAY SMOKER: ICD-10-CM

## 2022-12-28 PROCEDURE — 1159F PR MEDICATION LIST DOCUMENTED IN MEDICAL RECORD: ICD-10-PCS | Mod: ,,, | Performed by: NURSE PRACTITIONER

## 2022-12-28 PROCEDURE — 3074F SYST BP LT 130 MM HG: CPT | Mod: ,,, | Performed by: NURSE PRACTITIONER

## 2022-12-28 PROCEDURE — 1160F RVW MEDS BY RX/DR IN RCRD: CPT | Mod: ,,, | Performed by: NURSE PRACTITIONER

## 2022-12-28 PROCEDURE — 99213 PR OFFICE/OUTPT VISIT, EST, LEVL III, 20-29 MIN: ICD-10-PCS | Mod: ,,, | Performed by: NURSE PRACTITIONER

## 2022-12-28 PROCEDURE — 3008F BODY MASS INDEX DOCD: CPT | Mod: ,,, | Performed by: NURSE PRACTITIONER

## 2022-12-28 PROCEDURE — 3074F PR MOST RECENT SYSTOLIC BLOOD PRESSURE < 130 MM HG: ICD-10-PCS | Mod: ,,, | Performed by: NURSE PRACTITIONER

## 2022-12-28 PROCEDURE — 3008F PR BODY MASS INDEX (BMI) DOCUMENTED: ICD-10-PCS | Mod: ,,, | Performed by: NURSE PRACTITIONER

## 2022-12-28 PROCEDURE — 1159F MED LIST DOCD IN RCRD: CPT | Mod: ,,, | Performed by: NURSE PRACTITIONER

## 2022-12-28 PROCEDURE — 3080F DIAST BP >= 90 MM HG: CPT | Mod: ,,, | Performed by: NURSE PRACTITIONER

## 2022-12-28 PROCEDURE — 99213 OFFICE O/P EST LOW 20 MIN: CPT | Mod: ,,, | Performed by: NURSE PRACTITIONER

## 2022-12-28 PROCEDURE — 3080F PR MOST RECENT DIASTOLIC BLOOD PRESSURE >= 90 MM HG: ICD-10-PCS | Mod: ,,, | Performed by: NURSE PRACTITIONER

## 2022-12-28 PROCEDURE — 1160F PR REVIEW ALL MEDS BY PRESCRIBER/CLIN PHARMACIST DOCUMENTED: ICD-10-PCS | Mod: ,,, | Performed by: NURSE PRACTITIONER

## 2022-12-28 RX ORDER — SERTRALINE HYDROCHLORIDE 50 MG/1
50 TABLET, FILM COATED ORAL DAILY
Qty: 90 TABLET | Refills: 1 | Status: SHIPPED | OUTPATIENT
Start: 2022-12-28 | End: 2023-05-03 | Stop reason: SDUPTHER

## 2022-12-28 NOTE — PROGRESS NOTES
Clinic note     Patient name: Seble Real is a 27 y.o. female   Chief compliant   Chief Complaint   Patient presents with    Medication Refill     No problems at this time.        Subjective     History of present illness   In clinic for routine follow up and medication refills   Was seen in ED on 22 with discharge diagnosis of constipation, she reports all symptoms have resolved and she is having routine normal Bms  Requesting refill on Zoloft  She had her third child by  three months ago, she is breast feeding, she has not had menstrual cycle since    She denies any acute concerns at present   OB: Dr Gonzalez         Social History     Tobacco Use    Smoking status: Every Day     Types: Vaping with nicotine    Smokeless tobacco: Never   Substance Use Topics    Alcohol use: Never    Drug use: Never       Review of patient's allergies indicates:   Allergen Reactions    Cefprozil        Past Medical History:   Diagnosis Date    Anxiety     Depression        Past Surgical History:   Procedure Laterality Date     SECTION          History reviewed. No pertinent family history.      Current Outpatient Medications:     polyethylene glycol (GLYCOLAX) 17 gram/dose powder, Take 17 g by mouth once daily. (Patient not taking: Reported on 2022), Disp: 510 g, Rfl: 0    sertraline (ZOLOFT) 50 MG tablet, Take 1 tablet (50 mg total) by mouth once daily., Disp: 90 tablet, Rfl: 1    triamcinolone acetonide 0.1% (KENALOG) 0.1 % cream, Apply topically 2 (two) times daily. for 7 days, Disp: 15 g, Rfl: 0    Review of Systems   Constitutional:  Negative for appetite change, chills, fatigue, fever and unexpected weight change.   Respiratory:  Negative for cough and shortness of breath.    Cardiovascular:  Negative for chest pain, palpitations and leg swelling.   Gastrointestinal:  Negative for abdominal pain, change in bowel habit, constipation, diarrhea, nausea, vomiting and change in bowel  habit.   Genitourinary:  Negative for dysuria.   Musculoskeletal:  Negative for arthralgias, gait problem and myalgias.   Neurological:  Negative for dizziness, syncope, light-headedness and headaches.   Psychiatric/Behavioral:  Negative for confusion, dysphoric mood and sleep disturbance. The patient is not nervous/anxious.      Objective     BP (!) 128/90   Pulse (!) 55   Ht 5' (1.524 m)   Wt 57.6 kg (127 lb)   SpO2 98%   Breastfeeding Yes   BMI 24.80 kg/m²     Physical Exam   Constitutional: She is oriented to person, place, and time. She is cooperative. No distress.   HENT:   Head: Atraumatic.   Mouth/Throat: Mucous membranes are moist.   Eyes: Pupils are equal, round, and reactive to light. Conjunctivae are normal.   Cardiovascular: Normal rate and regular rhythm. Pulmonary:      Effort: Pulmonary effort is normal. No respiratory distress.      Breath sounds: Normal breath sounds. No wheezing, rhonchi or rales.     Abdominal: Soft. Bowel sounds are normal. She exhibits no distension. There is no abdominal tenderness.   Musculoskeletal:         General: Normal range of motion.      Cervical back: Normal range of motion and neck supple.      Right lower leg: No edema.      Left lower leg: No edema.   Neurological: She is alert and oriented to person, place, and time. Gait normal.   Skin: Skin is warm and dry.   Psychiatric: Her speech is normal and behavior is normal. Mood, affect and thought content normal. Thought content is not paranoid. She expresses no homicidal and no suicidal ideation. She expresses no suicidal plans and no homicidal plans.     Lab Results   Component Value Date    WBC 3.46 (L) 12/18/2022    HGB 12.3 12/18/2022    HCT 38.7 12/18/2022    MCV 71.0 (L) 12/18/2022     12/18/2022       CMP  Sodium   Date Value Ref Range Status   12/18/2022 141 136 - 145 mmol/L Final     Potassium   Date Value Ref Range Status   12/18/2022 4.2 3.5 - 5.1 mmol/L Final     Chloride   Date Value Ref  Range Status   12/18/2022 102 98 - 107 mmol/L Final     CO2   Date Value Ref Range Status   12/18/2022 28 21 - 32 mmol/L Final     Glucose   Date Value Ref Range Status   12/18/2022 90 74 - 106 mg/dL Final     BUN   Date Value Ref Range Status   12/18/2022 16 7 - 18 mg/dL Final     Creatinine   Date Value Ref Range Status   12/18/2022 0.74 0.55 - 1.02 mg/dL Final     Calcium   Date Value Ref Range Status   12/18/2022 8.3 (L) 8.5 - 10.1 mg/dL Final     Total Protein   Date Value Ref Range Status   12/18/2022 7.5 6.4 - 8.2 g/dL Final     Albumin   Date Value Ref Range Status   12/18/2022 4.1 3.5 - 5.0 g/dL Final     Bilirubin, Total   Date Value Ref Range Status   12/18/2022 0.7 >0.0 - 1.2 mg/dL Final     Alk Phos   Date Value Ref Range Status   12/18/2022 81 37 - 98 U/L Final     AST   Date Value Ref Range Status   12/18/2022 8 (L) 15 - 37 U/L Final     ALT   Date Value Ref Range Status   12/18/2022 14 13 - 56 U/L Final     Anion Gap   Date Value Ref Range Status   12/18/2022 15 7 - 16 mmol/L Final     No results found for: TSH  Lab Results   Component Value Date    CHOL 151 03/08/2022     Lab Results   Component Value Date    HDL 47 03/08/2022     Lab Results   Component Value Date    LDLCALC 89 03/08/2022     Lab Results   Component Value Date    TRIG 75 03/08/2022     Lab Results   Component Value Date    CHOLHDL 3.2 03/08/2022     No results found for: LABA1C, HGBA1C      Assessment and Plan   Depression, unspecified depression type  -     sertraline (ZOLOFT) 50 MG tablet; Take 1 tablet (50 mg total) by mouth once daily.  Dispense: 90 tablet; Refill: 1    Anxiety  -     sertraline (ZOLOFT) 50 MG tablet; Take 1 tablet (50 mg total) by mouth once daily.  Dispense: 90 tablet; Refill: 1    Current every day smoker          Patient Instructions  Patient Instructions   Refills provided on routine medications   Follow up in six months and as needed

## 2023-02-13 PROBLEM — Z3A.11 11 WEEKS GESTATION OF PREGNANCY: Status: RESOLVED | Noted: 2022-03-08 | Resolved: 2023-02-13

## 2023-03-09 ENCOUNTER — OFFICE VISIT (OUTPATIENT)
Dept: FAMILY MEDICINE | Facility: CLINIC | Age: 28
End: 2023-03-09
Payer: COMMERCIAL

## 2023-03-09 VITALS
HEIGHT: 60 IN | WEIGHT: 116 LBS | DIASTOLIC BLOOD PRESSURE: 67 MMHG | HEART RATE: 70 BPM | SYSTOLIC BLOOD PRESSURE: 110 MMHG | OXYGEN SATURATION: 98 % | BODY MASS INDEX: 22.78 KG/M2

## 2023-03-09 DIAGNOSIS — F32.A DEPRESSION, UNSPECIFIED DEPRESSION TYPE: ICD-10-CM

## 2023-03-09 DIAGNOSIS — F41.9 ANXIETY: ICD-10-CM

## 2023-03-09 DIAGNOSIS — Z13.220 SCREENING FOR HYPERLIPIDEMIA: ICD-10-CM

## 2023-03-09 DIAGNOSIS — Z00.00 ROUTINE GENERAL MEDICAL EXAMINATION AT A HEALTH CARE FACILITY: Primary | ICD-10-CM

## 2023-03-09 DIAGNOSIS — F17.200 CURRENT EVERY DAY SMOKER: ICD-10-CM

## 2023-03-09 DIAGNOSIS — Z13.1 SCREENING FOR DIABETES MELLITUS: ICD-10-CM

## 2023-03-09 LAB
CHOLEST SERPL-MCNC: 165 MG/DL (ref 0–200)
CHOLEST/HDLC SERPL: 2.8 {RATIO}
GLUCOSE SERPL-MCNC: 93 MG/DL (ref 74–106)
HDLC SERPL-MCNC: 60 MG/DL (ref 40–60)
LDLC SERPL CALC-MCNC: 94 MG/DL
LDLC/HDLC SERPL: 1.6 {RATIO}
NONHDLC SERPL-MCNC: 105 MG/DL
TRIGL SERPL-MCNC: 56 MG/DL (ref 35–150)
VLDLC SERPL-MCNC: 11 MG/DL

## 2023-03-09 PROCEDURE — 3008F PR BODY MASS INDEX (BMI) DOCUMENTED: ICD-10-PCS | Mod: ,,, | Performed by: NURSE PRACTITIONER

## 2023-03-09 PROCEDURE — 3078F DIAST BP <80 MM HG: CPT | Mod: ,,, | Performed by: NURSE PRACTITIONER

## 2023-03-09 PROCEDURE — 82947 ASSAY GLUCOSE BLOOD QUANT: CPT | Mod: ,,, | Performed by: CLINICAL MEDICAL LABORATORY

## 2023-03-09 PROCEDURE — 1159F MED LIST DOCD IN RCRD: CPT | Mod: ,,, | Performed by: NURSE PRACTITIONER

## 2023-03-09 PROCEDURE — 80061 LIPID PANEL: ICD-10-PCS | Mod: ,,, | Performed by: CLINICAL MEDICAL LABORATORY

## 2023-03-09 PROCEDURE — 99395 PREV VISIT EST AGE 18-39: CPT | Mod: 25,,, | Performed by: NURSE PRACTITIONER

## 2023-03-09 PROCEDURE — 80061 LIPID PANEL: CPT | Mod: ,,, | Performed by: CLINICAL MEDICAL LABORATORY

## 2023-03-09 PROCEDURE — 99395 PR PREVENTIVE VISIT,EST,18-39: ICD-10-PCS | Mod: 25,,, | Performed by: NURSE PRACTITIONER

## 2023-03-09 PROCEDURE — 82947 GLUCOSE, RANDOM: ICD-10-PCS | Mod: ,,, | Performed by: CLINICAL MEDICAL LABORATORY

## 2023-03-09 PROCEDURE — 99406 PR TOBACCO USE CESSATION INTERMEDIATE 3-10 MINUTES: ICD-10-PCS | Mod: ,,, | Performed by: NURSE PRACTITIONER

## 2023-03-09 PROCEDURE — 3078F PR MOST RECENT DIASTOLIC BLOOD PRESSURE < 80 MM HG: ICD-10-PCS | Mod: ,,, | Performed by: NURSE PRACTITIONER

## 2023-03-09 PROCEDURE — 3008F BODY MASS INDEX DOCD: CPT | Mod: ,,, | Performed by: NURSE PRACTITIONER

## 2023-03-09 PROCEDURE — 99406 BEHAV CHNG SMOKING 3-10 MIN: CPT | Mod: ,,, | Performed by: NURSE PRACTITIONER

## 2023-03-09 PROCEDURE — 1160F PR REVIEW ALL MEDS BY PRESCRIBER/CLIN PHARMACIST DOCUMENTED: ICD-10-PCS | Mod: ,,, | Performed by: NURSE PRACTITIONER

## 2023-03-09 PROCEDURE — 1159F PR MEDICATION LIST DOCUMENTED IN MEDICAL RECORD: ICD-10-PCS | Mod: ,,, | Performed by: NURSE PRACTITIONER

## 2023-03-09 PROCEDURE — 3074F SYST BP LT 130 MM HG: CPT | Mod: ,,, | Performed by: NURSE PRACTITIONER

## 2023-03-09 PROCEDURE — 1160F RVW MEDS BY RX/DR IN RCRD: CPT | Mod: ,,, | Performed by: NURSE PRACTITIONER

## 2023-03-09 PROCEDURE — 3074F PR MOST RECENT SYSTOLIC BLOOD PRESSURE < 130 MM HG: ICD-10-PCS | Mod: ,,, | Performed by: NURSE PRACTITIONER

## 2023-03-09 NOTE — PROGRESS NOTES
Clinic note     Patient name: Seble Real is a 27 y.o. female   Chief compliant   Chief Complaint   Patient presents with    Healthy You     No problems at this time. Needs refills if due.        Subjective     History of present illness   [employer] [facility] visit   Denies any acute concerns at present     Hx: anxiety, depression, symptoms well controlled on current medication     Vaping with nicotine, smoking cessation discussed     Last Gyn exam : 5 months ago       Social History     Tobacco Use    Smoking status: Every Day     Types: Vaping with nicotine     Passive exposure: Current    Smokeless tobacco: Never   Substance Use Topics    Alcohol use: Never    Drug use: Never       Review of patient's allergies indicates:   Allergen Reactions    Cefprozil        Past Medical History:   Diagnosis Date    Anxiety     Depression        Past Surgical History:   Procedure Laterality Date     SECTION          History reviewed. No pertinent family history.      Current Outpatient Medications:     sertraline (ZOLOFT) 50 MG tablet, Take 1 tablet (50 mg total) by mouth once daily., Disp: 90 tablet, Rfl: 1    Review of Systems   Constitutional:  Negative for appetite change, chills, fatigue, fever and unexpected weight change.   Respiratory:  Negative for cough and shortness of breath.    Cardiovascular:  Negative for chest pain, palpitations and leg swelling.   Gastrointestinal:  Negative for abdominal pain, change in bowel habit, constipation, diarrhea, nausea, vomiting and change in bowel habit.   Genitourinary:  Negative for dysuria and frequency.   Musculoskeletal:  Negative for arthralgias, gait problem and myalgias.   Neurological:  Negative for dizziness, syncope, light-headedness and headaches.   Psychiatric/Behavioral:  Negative for dysphoric mood and sleep disturbance. The patient is not nervous/anxious.      Objective     /67   Pulse 70   Ht 5' (1.524 m)   Wt 52.6 kg (116 lb)   LMP 2022   SpO2  98%   Breastfeeding Yes   BMI 22.65 kg/m²     Physical Exam   Constitutional: She is oriented to person, place, and time. She is cooperative. No distress.   HENT:   Head: Atraumatic.   Mouth/Throat: Mucous membranes are moist.   Cardiovascular: Normal rate and regular rhythm. Pulmonary:      Effort: Pulmonary effort is normal. No respiratory distress.      Breath sounds: Normal breath sounds. No wheezing, rhonchi or rales.     Abdominal: Soft. Bowel sounds are normal. She exhibits no distension. There is no abdominal tenderness.   Musculoskeletal:         General: Normal range of motion.      Cervical back: Neck supple.      Right lower leg: No edema.      Left lower leg: No edema.   Neurological: She is alert and oriented to person, place, and time. Gait normal.   Skin: Skin is warm and dry.   Psychiatric: Her speech is normal and behavior is normal. Mood, affect and thought content normal. Thought content is not paranoid. She expresses no homicidal and no suicidal ideation. She expresses no suicidal plans and no homicidal plans.     Lab Results   Component Value Date    WBC 3.46 (L) 12/18/2022    HGB 12.3 12/18/2022    HCT 38.7 12/18/2022    MCV 71.0 (L) 12/18/2022     12/18/2022       CMP  Sodium   Date Value Ref Range Status   12/18/2022 141 136 - 145 mmol/L Final     Potassium   Date Value Ref Range Status   12/18/2022 4.2 3.5 - 5.1 mmol/L Final     Chloride   Date Value Ref Range Status   12/18/2022 102 98 - 107 mmol/L Final     CO2   Date Value Ref Range Status   12/18/2022 28 21 - 32 mmol/L Final     Glucose   Date Value Ref Range Status   12/18/2022 90 74 - 106 mg/dL Final     BUN   Date Value Ref Range Status   12/18/2022 16 7 - 18 mg/dL Final     Creatinine   Date Value Ref Range Status   12/18/2022 0.74 0.55 - 1.02 mg/dL Final     Calcium   Date Value Ref Range Status   12/18/2022 8.3 (L) 8.5 - 10.1 mg/dL Final     Total Protein   Date Value Ref Range Status   12/18/2022 7.5 6.4 - 8.2 g/dL Final      Albumin   Date Value Ref Range Status   12/18/2022 4.1 3.5 - 5.0 g/dL Final     Bilirubin, Total   Date Value Ref Range Status   12/18/2022 0.7 >0.0 - 1.2 mg/dL Final     Alk Phos   Date Value Ref Range Status   12/18/2022 81 37 - 98 U/L Final     AST   Date Value Ref Range Status   12/18/2022 8 (L) 15 - 37 U/L Final     ALT   Date Value Ref Range Status   12/18/2022 14 13 - 56 U/L Final     Anion Gap   Date Value Ref Range Status   12/18/2022 15 7 - 16 mmol/L Final     No results found for: TSH  Lab Results   Component Value Date    CHOL 151 03/08/2022     Lab Results   Component Value Date    HDL 47 03/08/2022     Lab Results   Component Value Date    LDLCALC 89 03/08/2022     Lab Results   Component Value Date    TRIG 75 03/08/2022     Lab Results   Component Value Date    CHOLHDL 3.2 03/08/2022     No results found for: LABA1C, HGBA1C      Assessment and Plan   Routine general medical examination at a [facility] facility    Screening for hyperlipidemia  -     Lipid Panel; Future; Expected date: 03/09/2023    Screening for diabetes mellitus  -     Glucose, Random; Future; Expected date: 03/09/2023    Anxiety    Depression, unspecified depression type    Current every day smoker    Assistance with smoking cessation was offered, including:  []  Medications  [x]  Counseling  [x]  Printed Information on Smoking Cessation  []  Referral to a Smoking Cessation Program    Patient was counseled regarding smoking for 3-10 minutes.        Patient Instructions  Patient Instructions   Lab obtained in clinic today, we will notify you of results and any necessary changes to plan of care         Health goals to improve overall health and well-being:  BMI < 30 - lose 1-2 lbs per week, healthy/DASH diet, exercise at least 5 days per week  fasting glucose < 100; if applicable A1c < 5.7%  SBP < 130 & DBP < 80  LDL chol < 100     I have reviewed the documentation for this clinical encounter and agree with the assessment and plan  as put forth by the nurse practitioner.

## 2023-05-03 ENCOUNTER — OFFICE VISIT (OUTPATIENT)
Dept: FAMILY MEDICINE | Facility: CLINIC | Age: 28
End: 2023-05-03
Payer: COMMERCIAL

## 2023-05-03 VITALS
WEIGHT: 115 LBS | BODY MASS INDEX: 22.58 KG/M2 | HEIGHT: 60 IN | OXYGEN SATURATION: 99 % | HEART RATE: 56 BPM | DIASTOLIC BLOOD PRESSURE: 74 MMHG | SYSTOLIC BLOOD PRESSURE: 114 MMHG

## 2023-05-03 DIAGNOSIS — F32.A DEPRESSION, UNSPECIFIED DEPRESSION TYPE: ICD-10-CM

## 2023-05-03 DIAGNOSIS — F17.200 CURRENT EVERY DAY SMOKER: ICD-10-CM

## 2023-05-03 DIAGNOSIS — F41.9 ANXIETY: ICD-10-CM

## 2023-05-03 DIAGNOSIS — N89.8 ITCHING IN THE VAGINAL AREA: Primary | ICD-10-CM

## 2023-05-03 PROCEDURE — 3078F DIAST BP <80 MM HG: CPT | Mod: ,,, | Performed by: NURSE PRACTITIONER

## 2023-05-03 PROCEDURE — 3074F SYST BP LT 130 MM HG: CPT | Mod: ,,, | Performed by: NURSE PRACTITIONER

## 2023-05-03 PROCEDURE — 1159F PR MEDICATION LIST DOCUMENTED IN MEDICAL RECORD: ICD-10-PCS | Mod: ,,, | Performed by: NURSE PRACTITIONER

## 2023-05-03 PROCEDURE — 3008F BODY MASS INDEX DOCD: CPT | Mod: ,,, | Performed by: NURSE PRACTITIONER

## 2023-05-03 PROCEDURE — 1160F RVW MEDS BY RX/DR IN RCRD: CPT | Mod: ,,, | Performed by: NURSE PRACTITIONER

## 2023-05-03 PROCEDURE — 3078F PR MOST RECENT DIASTOLIC BLOOD PRESSURE < 80 MM HG: ICD-10-PCS | Mod: ,,, | Performed by: NURSE PRACTITIONER

## 2023-05-03 PROCEDURE — 3074F PR MOST RECENT SYSTOLIC BLOOD PRESSURE < 130 MM HG: ICD-10-PCS | Mod: ,,, | Performed by: NURSE PRACTITIONER

## 2023-05-03 PROCEDURE — 3008F PR BODY MASS INDEX (BMI) DOCUMENTED: ICD-10-PCS | Mod: ,,, | Performed by: NURSE PRACTITIONER

## 2023-05-03 PROCEDURE — 99213 PR OFFICE/OUTPT VISIT, EST, LEVL III, 20-29 MIN: ICD-10-PCS | Mod: ,,, | Performed by: NURSE PRACTITIONER

## 2023-05-03 PROCEDURE — 1160F PR REVIEW ALL MEDS BY PRESCRIBER/CLIN PHARMACIST DOCUMENTED: ICD-10-PCS | Mod: ,,, | Performed by: NURSE PRACTITIONER

## 2023-05-03 PROCEDURE — 1159F MED LIST DOCD IN RCRD: CPT | Mod: ,,, | Performed by: NURSE PRACTITIONER

## 2023-05-03 PROCEDURE — 99213 OFFICE O/P EST LOW 20 MIN: CPT | Mod: ,,, | Performed by: NURSE PRACTITIONER

## 2023-05-03 RX ORDER — NYSTATIN 100000 U/G
CREAM TOPICAL 2 TIMES DAILY
Qty: 30 G | Refills: 0 | Status: SHIPPED | OUTPATIENT
Start: 2023-05-03 | End: 2023-11-01 | Stop reason: ALTCHOICE

## 2023-05-03 RX ORDER — SERTRALINE HYDROCHLORIDE 50 MG/1
50 TABLET, FILM COATED ORAL DAILY
Qty: 90 TABLET | Refills: 1 | Status: SHIPPED | OUTPATIENT
Start: 2023-05-03 | End: 2023-09-26 | Stop reason: SDUPTHER

## 2023-05-03 NOTE — PROGRESS NOTES
Clinic note     Patient name: Seble Real is a 27 y.o. female   Chief compliant   Chief Complaint   Patient presents with    Vaginal Itching     Has treat for yeast otc. Denies any redness, no fever.     Medication Refill     Need med refills while she is here       Subjective     History of present illness   In clinic for evaluation of genital itching x one months, denies any rash or skin lesions, vaginal discharge,or dysuria  States she has taken diflucan x two doses with no improvement   She continues breastfeeding 7 month old child          Social History     Tobacco Use    Smoking status: Every Day     Types: Vaping with nicotine     Passive exposure: Current    Smokeless tobacco: Never   Substance Use Topics    Alcohol use: Never    Drug use: Never       Review of patient's allergies indicates:   Allergen Reactions    Cefprozil        Past Medical History:   Diagnosis Date    Anxiety     Depression        Past Surgical History:   Procedure Laterality Date     SECTION          History reviewed. No pertinent family history.      Current Outpatient Medications:     nystatin (MYCOSTATIN) cream, Apply topically 2 (two) times daily., Disp: 30 g, Rfl: 0    sertraline (ZOLOFT) 50 MG tablet, Take 1 tablet (50 mg total) by mouth once daily., Disp: 90 tablet, Rfl: 1    Review of Systems   Constitutional:  Negative for chills and fever.   HENT:  Negative for nasal congestion and sore throat.    Respiratory:  Negative for cough and shortness of breath.    Cardiovascular:  Negative for chest pain.   Gastrointestinal:  Negative for diarrhea, nausea and vomiting.   Genitourinary:  Negative for genital sores, vaginal discharge, vaginal pain and vaginal dryness.        Genital itching    Musculoskeletal:  Negative for myalgias.   Neurological:  Negative for headaches.     Objective     /74   Pulse (!) 56   Ht 5' (1.524 m)   Wt 52.2 kg (115 lb)   SpO2 99%   Breastfeeding Yes   BMI 22.46 kg/m²      Physical Exam   Constitutional: She is oriented to person, place, and time. No distress.   HENT:   Head: Atraumatic.   Mouth/Throat: Mucous membranes are moist.   Cardiovascular: Normal rate and regular rhythm. Pulmonary:      Effort: Pulmonary effort is normal. No respiratory distress.      Breath sounds: Normal breath sounds. No wheezing, rhonchi or rales.     Abdominal: Soft. Bowel sounds are normal. She exhibits no distension. There is no abdominal tenderness.   Musculoskeletal:      Cervical back: Neck supple.   Neurological: She is alert and oriented to person, place, and time. Gait normal.   Skin: Skin is warm and dry.   Psychiatric: Her behavior is normal. Mood normal.     Lab Results   Component Value Date    WBC 3.46 (L) 12/18/2022    HGB 12.3 12/18/2022    HCT 38.7 12/18/2022    MCV 71.0 (L) 12/18/2022     12/18/2022       CMP  Sodium   Date Value Ref Range Status   12/18/2022 141 136 - 145 mmol/L Final     Potassium   Date Value Ref Range Status   12/18/2022 4.2 3.5 - 5.1 mmol/L Final     Chloride   Date Value Ref Range Status   12/18/2022 102 98 - 107 mmol/L Final     CO2   Date Value Ref Range Status   12/18/2022 28 21 - 32 mmol/L Final     Glucose   Date Value Ref Range Status   03/09/2023 93 74 - 106 mg/dL Final     BUN   Date Value Ref Range Status   12/18/2022 16 7 - 18 mg/dL Final     Creatinine   Date Value Ref Range Status   12/18/2022 0.74 0.55 - 1.02 mg/dL Final     Calcium   Date Value Ref Range Status   12/18/2022 8.3 (L) 8.5 - 10.1 mg/dL Final     Total Protein   Date Value Ref Range Status   12/18/2022 7.5 6.4 - 8.2 g/dL Final     Albumin   Date Value Ref Range Status   12/18/2022 4.1 3.5 - 5.0 g/dL Final     Bilirubin, Total   Date Value Ref Range Status   12/18/2022 0.7 >0.0 - 1.2 mg/dL Final     Alk Phos   Date Value Ref Range Status   12/18/2022 81 37 - 98 U/L Final     AST   Date Value Ref Range Status   12/18/2022 8 (L) 15 - 37 U/L Final     ALT   Date Value Ref Range  Status   12/18/2022 14 13 - 56 U/L Final     Anion Gap   Date Value Ref Range Status   12/18/2022 15 7 - 16 mmol/L Final     No results found for: TSH  Lab Results   Component Value Date    CHOL 165 03/09/2023    CHOL 151 03/08/2022     Lab Results   Component Value Date    HDL 60 03/09/2023    HDL 47 03/08/2022     Lab Results   Component Value Date    LDLCALC 94 03/09/2023    LDLCALC 89 03/08/2022     Lab Results   Component Value Date    TRIG 56 03/09/2023    TRIG 75 03/08/2022     Lab Results   Component Value Date    CHOLHDL 2.8 03/09/2023    CHOLHDL 3.2 03/08/2022     No results found for: LABA1C, HGBA1C      Assessment and Plan   Itching in the vaginal area  -     nystatin (MYCOSTATIN) cream; Apply topically 2 (two) times daily.  Dispense: 30 g; Refill: 0    Current every day smoker    Depression, unspecified depression type  -     sertraline (ZOLOFT) 50 MG tablet; Take 1 tablet (50 mg total) by mouth once daily.  Dispense: 90 tablet; Refill: 1    Anxiety  -     sertraline (ZOLOFT) 50 MG tablet; Take 1 tablet (50 mg total) by mouth once daily.  Dispense: 90 tablet; Refill: 1          Patient Instructions  Patient Instructions   Nystatin topical cream, apply two times daily   You may also use an OTC hydrocortisone cream as well to help with itching    Follow up in one week if symptoms persist sooner if symptoms worsen

## 2023-05-03 NOTE — PATIENT INSTRUCTIONS
Nystatin topical cream, apply two times daily   You may also use an OTC hydrocortisone cream as well to help with itching    Follow up in one week if symptoms persist sooner if symptoms worsen

## 2023-07-05 ENCOUNTER — OFFICE VISIT (OUTPATIENT)
Dept: OBSTETRICS AND GYNECOLOGY | Facility: CLINIC | Age: 28
End: 2023-07-05

## 2023-07-05 VITALS
WEIGHT: 116.63 LBS | TEMPERATURE: 98 F | OXYGEN SATURATION: 96 % | SYSTOLIC BLOOD PRESSURE: 116 MMHG | DIASTOLIC BLOOD PRESSURE: 63 MMHG | HEIGHT: 60 IN | HEART RATE: 60 BPM | BODY MASS INDEX: 22.9 KG/M2

## 2023-07-05 DIAGNOSIS — N89.8 VAGINAL ODOR: Primary | ICD-10-CM

## 2023-07-05 DIAGNOSIS — L29.2 VULVAR ITCHING: ICD-10-CM

## 2023-07-05 LAB
CANDIDA SPECIES: NEGATIVE
GARDNERELLA: POSITIVE
TRICHOMONAS: NEGATIVE

## 2023-07-05 PROCEDURE — 87480 CANDIDA DNA DIR PROBE: CPT | Mod: ,,, | Performed by: CLINICAL MEDICAL LABORATORY

## 2023-07-05 PROCEDURE — 87491 CHLMYD TRACH DNA AMP PROBE: CPT | Mod: ,,, | Performed by: CLINICAL MEDICAL LABORATORY

## 2023-07-05 PROCEDURE — 87491 CHLAMYDIA/GONORRHOEAE(GC), PCR: ICD-10-PCS | Mod: ,,, | Performed by: CLINICAL MEDICAL LABORATORY

## 2023-07-05 PROCEDURE — 87510 GARDNER VAG DNA DIR PROBE: CPT | Mod: ,,, | Performed by: CLINICAL MEDICAL LABORATORY

## 2023-07-05 PROCEDURE — 87591 N.GONORRHOEAE DNA AMP PROB: CPT | Mod: ,,, | Performed by: CLINICAL MEDICAL LABORATORY

## 2023-07-05 PROCEDURE — 87660 TRICHOMONAS VAGIN DIR PROBE: CPT | Mod: ,,, | Performed by: CLINICAL MEDICAL LABORATORY

## 2023-07-05 PROCEDURE — 87480 BACTERIAL VAGINOSIS: ICD-10-PCS | Mod: ,,, | Performed by: CLINICAL MEDICAL LABORATORY

## 2023-07-05 PROCEDURE — 87510 BACTERIAL VAGINOSIS: ICD-10-PCS | Mod: ,,, | Performed by: CLINICAL MEDICAL LABORATORY

## 2023-07-05 PROCEDURE — 87591 CHLAMYDIA/GONORRHOEAE(GC), PCR: ICD-10-PCS | Mod: ,,, | Performed by: CLINICAL MEDICAL LABORATORY

## 2023-07-05 PROCEDURE — 99213 PR OFFICE/OUTPT VISIT, EST, LEVL III, 20-29 MIN: ICD-10-PCS | Mod: ,,, | Performed by: ADVANCED PRACTICE MIDWIFE

## 2023-07-05 PROCEDURE — 87660 BACTERIAL VAGINOSIS: ICD-10-PCS | Mod: ,,, | Performed by: CLINICAL MEDICAL LABORATORY

## 2023-07-05 PROCEDURE — 99213 OFFICE O/P EST LOW 20 MIN: CPT | Mod: ,,, | Performed by: ADVANCED PRACTICE MIDWIFE

## 2023-07-05 NOTE — PROGRESS NOTES
Subjective     Patient ID: Seble Real is a 27 y.o. female.    Chief Complaint: vaginal itch and odor (Last pap: year ago )    Seble is c/o of vulvar itching for 1-2 months.  She is lactating and is not having periods.  She is not sexually active and does not want any birth control.  Review of Systems   Constitutional: Negative.    HENT: Negative.     Respiratory: Negative.     Cardiovascular: Negative.    Gastrointestinal: Negative.    Genitourinary:  Negative for vaginal discharge.        Vulvar itching   Neurological: Negative.    Psychiatric/Behavioral:          Takes Zoloft        Objective     Physical Exam  Vitals and nursing note reviewed.   Constitutional:       Appearance: She is normal weight.   HENT:      Head: Normocephalic.      Nose: Nose normal.   Eyes:      Extraocular Movements: Extraocular movements intact.   Cardiovascular:      Rate and Rhythm: Normal rate.   Pulmonary:      Effort: Pulmonary effort is normal.   Abdominal:      General: Abdomen is flat.      Palpations: Abdomen is soft.   Genitourinary:     Comments: Affirm self collected.    Skin:     General: Skin is warm and dry.   Neurological:      Mental Status: She is alert and oriented to person, place, and time.   Psychiatric:         Mood and Affect: Mood normal.         Behavior: Behavior normal.        Assessment and Plan     1. Vaginal odor  -     Chlamydia/GC, PCR; Future; Expected date: 07/05/2023  -     Bacterial Vaginosis; Future; Expected date: 07/05/2023    2. Vulvar itching  -     Bacterial Vaginosis; Future; Expected date: 07/05/2023      Plan:  Call result of testing  Use a mild soap like unscented Dove           No follow-ups on file.

## 2023-07-06 ENCOUNTER — PATIENT MESSAGE (OUTPATIENT)
Dept: OBSTETRICS AND GYNECOLOGY | Facility: CLINIC | Age: 28
End: 2023-07-06

## 2023-07-06 DIAGNOSIS — N76.0 BACTERIAL VAGINOSIS: Primary | ICD-10-CM

## 2023-07-06 DIAGNOSIS — B96.89 BACTERIAL VAGINOSIS: Primary | ICD-10-CM

## 2023-07-06 LAB
CHLAMYDIA BY PCR: NEGATIVE
N. GONORRHOEAE (GC) BY PCR: NEGATIVE

## 2023-07-06 RX ORDER — METRONIDAZOLE 7.5 MG/G
1 GEL VAGINAL 2 TIMES DAILY
Qty: 70 G | Refills: 0 | Status: SHIPPED | OUTPATIENT
Start: 2023-07-06 | End: 2023-11-01

## 2023-09-26 ENCOUNTER — OFFICE VISIT (OUTPATIENT)
Dept: FAMILY MEDICINE | Facility: CLINIC | Age: 28
End: 2023-09-26

## 2023-09-26 VITALS
OXYGEN SATURATION: 98 % | WEIGHT: 116 LBS | HEART RATE: 66 BPM | SYSTOLIC BLOOD PRESSURE: 110 MMHG | HEIGHT: 60 IN | BODY MASS INDEX: 22.78 KG/M2 | DIASTOLIC BLOOD PRESSURE: 74 MMHG

## 2023-09-26 DIAGNOSIS — F17.200 CURRENT EVERY DAY SMOKER: ICD-10-CM

## 2023-09-26 DIAGNOSIS — F41.9 ANXIETY: ICD-10-CM

## 2023-09-26 DIAGNOSIS — J01.00 ACUTE MAXILLARY SINUSITIS, RECURRENCE NOT SPECIFIED: Primary | ICD-10-CM

## 2023-09-26 DIAGNOSIS — F32.A DEPRESSION, UNSPECIFIED DEPRESSION TYPE: ICD-10-CM

## 2023-09-26 PROCEDURE — 99213 OFFICE O/P EST LOW 20 MIN: CPT | Mod: 25,,, | Performed by: NURSE PRACTITIONER

## 2023-09-26 PROCEDURE — 99406 PR TOBACCO USE CESSATION INTERMEDIATE 3-10 MINUTES: ICD-10-PCS | Mod: ,,, | Performed by: NURSE PRACTITIONER

## 2023-09-26 PROCEDURE — 99213 PR OFFICE/OUTPT VISIT, EST, LEVL III, 20-29 MIN: ICD-10-PCS | Mod: 25,,, | Performed by: NURSE PRACTITIONER

## 2023-09-26 PROCEDURE — 99406 BEHAV CHNG SMOKING 3-10 MIN: CPT | Mod: ,,, | Performed by: NURSE PRACTITIONER

## 2023-09-26 RX ORDER — AMOXICILLIN 875 MG/1
875 TABLET, FILM COATED ORAL EVERY 12 HOURS
Qty: 14 TABLET | Refills: 0 | Status: SHIPPED | OUTPATIENT
Start: 2023-09-26 | End: 2023-10-03

## 2023-09-26 RX ORDER — SERTRALINE HYDROCHLORIDE 50 MG/1
50 TABLET, FILM COATED ORAL DAILY
Qty: 90 TABLET | Refills: 1 | Status: SHIPPED | OUTPATIENT
Start: 2023-09-26

## 2023-09-26 RX ORDER — GUAIFENESIN 600 MG/1
600 TABLET, EXTENDED RELEASE ORAL 2 TIMES DAILY
Qty: 20 TABLET | Refills: 0 | Status: SHIPPED | OUTPATIENT
Start: 2023-09-26 | End: 2023-10-06

## 2023-09-26 NOTE — PATIENT INSTRUCTIONS
Amoxil and guaifenesin as prescribed   Increase water/fluid intake   Tylenol as needed for headache    Follow up in one week if symptoms persist sooner if symptoms worsen     Refill provided on routine medication

## 2023-09-26 NOTE — PROGRESS NOTES
Clinic note     Patient name: Seble Real is a 28 y.o. female   Chief compliant   Chief Complaint   Patient presents with    Sinus Problem     Sinus congestion for about a week. Left side of face is sore/painful. No fever.     Medication Refill       Subjective     History of present illness   In clinic for evaluation of sinus congestion and pressure, right facial pain and tenderness x one week     Past Medical History: anxiety, depression     She is breastfeeding her 12 month old son     Request refills on routine medication while in clinic                   Social History     Tobacco Use    Smoking status: Every Day     Types: Vaping with nicotine     Passive exposure: Current    Smokeless tobacco: Never   Substance Use Topics    Alcohol use: Never    Drug use: Never       Review of patient's allergies indicates:   Allergen Reactions    Cefprozil        Past Medical History:   Diagnosis Date    Anxiety     Depression        Past Surgical History:   Procedure Laterality Date     SECTION      3  c/s        Family History   Problem Relation Age of Onset    Hypertension Maternal Grandfather     Hypertension Father          Current Outpatient Medications:     amoxicillin (AMOXIL) 875 MG tablet, Take 1 tablet (875 mg total) by mouth every 12 (twelve) hours. for 7 days, Disp: 14 tablet, Rfl: 0    guaiFENesin (MUCINEX) 600 mg 12 hr tablet, Take 1 tablet (600 mg total) by mouth 2 (two) times daily. for 10 days, Disp: 20 tablet, Rfl: 0    metroNIDAZOLE (METROGEL) 0.75 % (37.5mg/5 gram) vaginal gel, Place 1 applicator vaginally 2 (two) times daily., Disp: 70 g, Rfl: 0    nystatin (MYCOSTATIN) cream, Apply topically 2 (two) times daily. (Patient not taking: Reported on 2023), Disp: 30 g, Rfl: 0    sertraline (ZOLOFT) 50 MG tablet, Take 1 tablet (50 mg total) by mouth once daily., Disp: 90 tablet, Rfl: 1    Review of Systems   Constitutional:  Negative for chills and fever.   HENT:  Positive for nasal  congestion and sinus pressure/congestion. Negative for facial swelling and sore throat.    Respiratory:  Negative for cough and shortness of breath.    Cardiovascular:  Negative for chest pain.   Gastrointestinal:  Negative for diarrhea, nausea and vomiting.   Musculoskeletal:  Negative for myalgias.   Neurological:  Positive for headaches. Negative for dizziness.   Psychiatric/Behavioral:  Negative for dysphoric mood and sleep disturbance. The patient is not nervous/anxious.        Objective     /74   Pulse 66   Ht 5' (1.524 m)   Wt 52.6 kg (116 lb)   SpO2 98%   Breastfeeding Yes   BMI 22.65 kg/m²     Physical Exam   Constitutional: She is oriented to person, place, and time. No distress.   HENT:   Head: Atraumatic.   Nose: Mucosal edema present. Right sinus exhibits maxillary sinus tenderness.   Mouth/Throat: Oropharynx is clear and moist. Mucous membranes are moist.   Cardiovascular: Normal rate and regular rhythm. Pulmonary:      Effort: Pulmonary effort is normal. No respiratory distress.      Breath sounds: Normal breath sounds. No wheezing, rhonchi or rales.     Abdominal: Soft. Bowel sounds are normal. She exhibits no distension. There is no abdominal tenderness.   Musculoskeletal:         General: Normal range of motion.      Cervical back: Neck supple.      Right lower leg: No edema.      Left lower leg: No edema.   Neurological: She is alert and oriented to person, place, and time. Gait normal.   Skin: Skin is warm and dry.   Psychiatric: Her behavior is normal. Mood normal.       Lab Results   Component Value Date    WBC 3.46 (L) 12/18/2022    HGB 12.3 12/18/2022    HCT 38.7 12/18/2022    MCV 71.0 (L) 12/18/2022     12/18/2022       CMP  Sodium   Date Value Ref Range Status   12/18/2022 141 136 - 145 mmol/L Final     Potassium   Date Value Ref Range Status   12/18/2022 4.2 3.5 - 5.1 mmol/L Final     Chloride   Date Value Ref Range Status   12/18/2022 102 98 - 107 mmol/L Final     CO2  "  Date Value Ref Range Status   12/18/2022 28 21 - 32 mmol/L Final     Glucose   Date Value Ref Range Status   03/09/2023 93 74 - 106 mg/dL Final     BUN   Date Value Ref Range Status   12/18/2022 16 7 - 18 mg/dL Final     Creatinine   Date Value Ref Range Status   12/18/2022 0.74 0.55 - 1.02 mg/dL Final     Calcium   Date Value Ref Range Status   12/18/2022 8.3 (L) 8.5 - 10.1 mg/dL Final     Total Protein   Date Value Ref Range Status   12/18/2022 7.5 6.4 - 8.2 g/dL Final     Albumin   Date Value Ref Range Status   12/18/2022 4.1 3.5 - 5.0 g/dL Final     Bilirubin, Total   Date Value Ref Range Status   12/18/2022 0.7 >0.0 - 1.2 mg/dL Final     Alk Phos   Date Value Ref Range Status   12/18/2022 81 37 - 98 U/L Final     AST   Date Value Ref Range Status   12/18/2022 8 (L) 15 - 37 U/L Final     ALT   Date Value Ref Range Status   12/18/2022 14 13 - 56 U/L Final     Anion Gap   Date Value Ref Range Status   12/18/2022 15 7 - 16 mmol/L Final     No results found for: "TSH"  Lab Results   Component Value Date    CHOL 165 03/09/2023    CHOL 151 03/08/2022     Lab Results   Component Value Date    HDL 60 03/09/2023    HDL 47 03/08/2022     Lab Results   Component Value Date    LDLCALC 94 03/09/2023    LDLCALC 89 03/08/2022     Lab Results   Component Value Date    TRIG 56 03/09/2023    TRIG 75 03/08/2022     Lab Results   Component Value Date    CHOLHDL 2.8 03/09/2023    CHOLHDL 3.2 03/08/2022     No results found for: "LABA1C", "HGBA1C"      Assessment and Plan   Acute maxillary sinusitis, recurrence not specified  -     amoxicillin (AMOXIL) 875 MG tablet; Take 1 tablet (875 mg total) by mouth every 12 (twelve) hours. for 7 days  Dispense: 14 tablet; Refill: 0  -     guaiFENesin (MUCINEX) 600 mg 12 hr tablet; Take 1 tablet (600 mg total) by mouth 2 (two) times daily. for 10 days  Dispense: 20 tablet; Refill: 0    Anxiety  -     sertraline (ZOLOFT) 50 MG tablet; Take 1 tablet (50 mg total) by mouth once daily.  Dispense: " 90 tablet; Refill: 1    Depression, unspecified depression type  -     sertraline (ZOLOFT) 50 MG tablet; Take 1 tablet (50 mg total) by mouth once daily.  Dispense: 90 tablet; Refill: 1    Current every day smoker      Assistance with smoking cessation was offered, including:  []  Medications  [x]  Counseling  [x]  Printed Information on Smoking Cessation  []  Referral to a Smoking Cessation Program    Patient was counseled regarding smoking for 3-10 minutes.      Patient Instructions  Patient Instructions   Amoxil and guaifenesin as prescribed   Increase water/fluid intake   Tylenol as needed for headache    Follow up in one week if symptoms persist sooner if symptoms worsen     Refill provided on routine medication

## 2023-11-01 ENCOUNTER — OFFICE VISIT (OUTPATIENT)
Dept: FAMILY MEDICINE | Facility: CLINIC | Age: 28
End: 2023-11-01

## 2023-11-01 VITALS
BODY MASS INDEX: 22.78 KG/M2 | SYSTOLIC BLOOD PRESSURE: 105 MMHG | DIASTOLIC BLOOD PRESSURE: 58 MMHG | HEIGHT: 60 IN | OXYGEN SATURATION: 99 % | WEIGHT: 116 LBS | HEART RATE: 49 BPM

## 2023-11-01 DIAGNOSIS — B37.2 YEAST DERMATITIS: ICD-10-CM

## 2023-11-01 DIAGNOSIS — R21 SKIN RASH: Primary | ICD-10-CM

## 2023-11-01 DIAGNOSIS — N60.42 DUCT ECTASIA OF BREAST, LEFT: ICD-10-CM

## 2023-11-01 DIAGNOSIS — F17.200 CURRENT EVERY DAY SMOKER: ICD-10-CM

## 2023-11-01 PROCEDURE — 99406 PR TOBACCO USE CESSATION INTERMEDIATE 3-10 MINUTES: ICD-10-PCS | Mod: ,,, | Performed by: NURSE PRACTITIONER

## 2023-11-01 PROCEDURE — 99213 PR OFFICE/OUTPT VISIT, EST, LEVL III, 20-29 MIN: ICD-10-PCS | Mod: 25,,, | Performed by: NURSE PRACTITIONER

## 2023-11-01 PROCEDURE — 99406 BEHAV CHNG SMOKING 3-10 MIN: CPT | Mod: ,,, | Performed by: NURSE PRACTITIONER

## 2023-11-01 PROCEDURE — 99213 OFFICE O/P EST LOW 20 MIN: CPT | Mod: 25,,, | Performed by: NURSE PRACTITIONER

## 2023-11-01 RX ORDER — FLUCONAZOLE 150 MG/1
150 TABLET ORAL DAILY
Qty: 2 TABLET | Refills: 0 | Status: SHIPPED | OUTPATIENT
Start: 2023-11-01

## 2023-11-01 NOTE — PROGRESS NOTES
"Clinic note     Patient name: Seble Real is a 28 y.o. female   Chief compliant   Chief Complaint   Patient presents with    Breast Problem     Son breastfeeds and has thrush's. Mom now had a rash. Also c/o of left side pain and knot. No fever or flu like symptoms at this time.  Been going on two or three days.        Subjective     History of present illness   In clinic for evaluation of possible yeast on breast, states son is being treated for thrush and she is breastfeeding  She also states she has a "tender lump" in left breast        Hx: anxiety, depression, symptoms well controlled on current medication         Social History     Tobacco Use    Smoking status: Former     Types: Vaping with nicotine     Passive exposure: Current    Smokeless tobacco: Never    Tobacco comments:     Stopped 5 days ago   Substance Use Topics    Alcohol use: Never    Drug use: Never       Review of patient's allergies indicates:   Allergen Reactions    Cefprozil        Past Medical History:   Diagnosis Date    Anxiety     Depression        Past Surgical History:   Procedure Laterality Date     SECTION      3  c/s        Family History   Problem Relation Age of Onset    Hypertension Maternal Grandfather     Hypertension Father          Current Outpatient Medications:     sertraline (ZOLOFT) 50 MG tablet, Take 1 tablet (50 mg total) by mouth once daily., Disp: 90 tablet, Rfl: 1    fluconazole (DIFLUCAN) 150 MG Tab, Take 1 tablet (150 mg total) by mouth once daily. Take one tablet PO today, may repeat in 72 hours if needed, Disp: 2 tablet, Rfl: 0    Review of Systems   Constitutional:  Negative for chills and fever.   HENT:  Negative for nasal congestion and sore throat.    Respiratory:  Negative for cough and shortness of breath.    Cardiovascular:  Negative for chest pain.   Gastrointestinal:  Negative for diarrhea, nausea and vomiting.   Musculoskeletal:  Negative for myalgias.   Integumentary:  Positive for breast " tenderness (tender lump left breast).   Neurological:  Negative for headaches.   Breast: Positive for tenderness (tender lump left breast).      Objective     BP (!) 105/58   Pulse (!) 49   Ht 5' (1.524 m)   Wt 52.6 kg (116 lb)   SpO2 99%   BMI 22.65 kg/m²     Physical Exam   Constitutional: She is oriented to person, place, and time. No distress.   HENT:   Head: Atraumatic.   Mouth/Throat: Mucous membranes are moist.   Cardiovascular: Normal rate and regular rhythm. Pulmonary:      Effort: Pulmonary effort is normal. No respiratory distress.      Breath sounds: Normal breath sounds. No wheezing, rhonchi or rales.      Comments: Tender nodule to lateral left breast, no erythema, no increased warmth    Abdominal: Soft. Bowel sounds are normal. She exhibits no distension. There is no abdominal tenderness.   Musculoskeletal:         General: Normal range of motion.      Cervical back: Neck supple.      Right lower leg: No edema.      Left lower leg: No edema.   Neurological: She is alert and oriented to person, place, and time. Gait normal.   Skin: Skin is warm and dry.   Psychiatric: Her behavior is normal. Mood normal.       Lab Results   Component Value Date    WBC 3.46 (L) 12/18/2022    HGB 12.3 12/18/2022    HCT 38.7 12/18/2022    MCV 71.0 (L) 12/18/2022     12/18/2022       CMP  Sodium   Date Value Ref Range Status   12/18/2022 141 136 - 145 mmol/L Final     Potassium   Date Value Ref Range Status   12/18/2022 4.2 3.5 - 5.1 mmol/L Final     Chloride   Date Value Ref Range Status   12/18/2022 102 98 - 107 mmol/L Final     CO2   Date Value Ref Range Status   12/18/2022 28 21 - 32 mmol/L Final     Glucose   Date Value Ref Range Status   03/09/2023 93 74 - 106 mg/dL Final     BUN   Date Value Ref Range Status   12/18/2022 16 7 - 18 mg/dL Final     Creatinine   Date Value Ref Range Status   12/18/2022 0.74 0.55 - 1.02 mg/dL Final     Calcium   Date Value Ref Range Status   12/18/2022 8.3 (L) 8.5 - 10.1  "mg/dL Final     Total Protein   Date Value Ref Range Status   12/18/2022 7.5 6.4 - 8.2 g/dL Final     Albumin   Date Value Ref Range Status   12/18/2022 4.1 3.5 - 5.0 g/dL Final     Bilirubin, Total   Date Value Ref Range Status   12/18/2022 0.7 >0.0 - 1.2 mg/dL Final     Alk Phos   Date Value Ref Range Status   12/18/2022 81 37 - 98 U/L Final     AST   Date Value Ref Range Status   12/18/2022 8 (L) 15 - 37 U/L Final     ALT   Date Value Ref Range Status   12/18/2022 14 13 - 56 U/L Final     Anion Gap   Date Value Ref Range Status   12/18/2022 15 7 - 16 mmol/L Final     No results found for: "TSH"  Lab Results   Component Value Date    CHOL 165 03/09/2023    CHOL 151 03/08/2022     Lab Results   Component Value Date    HDL 60 03/09/2023    HDL 47 03/08/2022     Lab Results   Component Value Date    LDLCALC 94 03/09/2023    LDLCALC 89 03/08/2022     Lab Results   Component Value Date    TRIG 56 03/09/2023    TRIG 75 03/08/2022     Lab Results   Component Value Date    CHOLHDL 2.8 03/09/2023    CHOLHDL 3.2 03/08/2022     No results found for: "LABA1C", "HGBA1C"      Assessment and Plan   Skin rash  -     fluconazole (DIFLUCAN) 150 MG Tab; Take 1 tablet (150 mg total) by mouth once daily. Take one tablet PO today, may repeat in 72 hours if needed  Dispense: 2 tablet; Refill: 0    Duct ectasia of breast, left    Yeast dermatitis    Current every day smoker          Patient Instructions  Patient Instructions   Diflucan, one now then may repeat in 72 hours if needed     Empty breast by pumping or feeding, heat application prior to emptying may help, cool compress after emptying     Call clinic for any redness, red streaks, hot to touch, fever, nausea or vomiting                                 "

## 2023-11-01 NOTE — PATIENT INSTRUCTIONS
Diflucan, one now then may repeat in 72 hours if needed     Empty breast by pumping or feeding, heat application prior to emptying may help, cool compress after emptying     Call clinic for any redness, red streaks, hot to touch, fever, nausea or vomiting

## 2024-01-25 ENCOUNTER — HOSPITAL ENCOUNTER (EMERGENCY)
Facility: HOSPITAL | Age: 29
Discharge: HOME | End: 2024-01-25

## 2024-01-25 VITALS
DIASTOLIC BLOOD PRESSURE: 73 MMHG | OXYGEN SATURATION: 99 % | BODY MASS INDEX: 22.19 KG/M2 | TEMPERATURE: 98 F | WEIGHT: 113 LBS | SYSTOLIC BLOOD PRESSURE: 110 MMHG | HEIGHT: 60 IN | HEART RATE: 80 BPM | RESPIRATION RATE: 15 BRPM

## 2024-01-25 DIAGNOSIS — R30.0 DYSURIA: ICD-10-CM

## 2024-01-25 DIAGNOSIS — R11.0 NAUSEA: ICD-10-CM

## 2024-01-25 DIAGNOSIS — R35.0 URINARY FREQUENCY: ICD-10-CM

## 2024-01-25 DIAGNOSIS — N30.00 ACUTE CYSTITIS WITHOUT HEMATURIA: Primary | ICD-10-CM

## 2024-01-25 LAB
BACTERIA #/AREA URNS HPF: ABNORMAL /HPF
BILIRUB UR QL STRIP: NEGATIVE
CLARITY UR: ABNORMAL
COLOR UR: YELLOW
GLUCOSE UR STRIP-MCNC: NEGATIVE MG/DL
HCG UR QL IA.RAPID: NEGATIVE
KETONES UR STRIP-SCNC: NEGATIVE MG/DL
LEUKOCYTE ESTERASE UR QL STRIP: ABNORMAL
MUCOUS THREADS #/AREA URNS HPF: ABNORMAL /HPF
NITRITE UR QL STRIP: NEGATIVE
PH UR STRIP: 6 PH UNITS
PROT UR QL STRIP: NEGATIVE
RBC # UR STRIP: NEGATIVE /UL
RBC #/AREA URNS HPF: ABNORMAL /HPF
SP GR UR STRIP: 1.02
SQUAMOUS #/AREA URNS LPF: ABNORMAL /LPF
UROBILINOGEN UR STRIP-ACNC: 0.2 MG/DL
WBC #/AREA URNS HPF: ABNORMAL /HPF

## 2024-01-25 PROCEDURE — 99284 EMERGENCY DEPT VISIT MOD MDM: CPT | Mod: ,,,

## 2024-01-25 PROCEDURE — 81001 URINALYSIS AUTO W/SCOPE: CPT

## 2024-01-25 PROCEDURE — 81025 URINE PREGNANCY TEST: CPT

## 2024-01-25 PROCEDURE — 87086 URINE CULTURE/COLONY COUNT: CPT

## 2024-01-25 PROCEDURE — 99284 EMERGENCY DEPT VISIT MOD MDM: CPT

## 2024-01-25 PROCEDURE — 25000003 PHARM REV CODE 250

## 2024-01-25 RX ORDER — ONDANSETRON 4 MG/1
4 TABLET, ORALLY DISINTEGRATING ORAL EVERY 8 HOURS PRN
Qty: 12 TABLET | Refills: 0 | Status: SHIPPED | OUTPATIENT
Start: 2024-01-25 | End: 2024-04-23 | Stop reason: ALTCHOICE

## 2024-01-25 RX ORDER — NITROFURANTOIN 25; 75 MG/1; MG/1
100 CAPSULE ORAL 2 TIMES DAILY
Qty: 10 CAPSULE | Refills: 0 | Status: SHIPPED | OUTPATIENT
Start: 2024-01-25 | End: 2024-01-30

## 2024-01-25 RX ORDER — ACETAMINOPHEN 325 MG/1
650 TABLET ORAL
Status: COMPLETED | OUTPATIENT
Start: 2024-01-25 | End: 2024-01-25

## 2024-01-25 RX ORDER — ONDANSETRON 4 MG/1
4 TABLET, ORALLY DISINTEGRATING ORAL
Status: COMPLETED | OUTPATIENT
Start: 2024-01-25 | End: 2024-01-25

## 2024-01-25 RX ADMIN — ONDANSETRON 4 MG: 4 TABLET, ORALLY DISINTEGRATING ORAL at 08:01

## 2024-01-25 RX ADMIN — ACETAMINOPHEN 650 MG: 325 TABLET ORAL at 08:01

## 2024-01-25 NOTE — DISCHARGE INSTRUCTIONS
Take antibiotics as prescribed.  Complete all antibiotics even if you feel better.  Alternate Tylenol and ibuprofen over-the-counter for pain and fever control.  Increase fluids (water, Powerade, Gatorade, Pedialyte) to maintain proper hydration.  Arrange for a follow up with the primary care doctor on Monday for a recheck and to have a repeat urinalysis completed at that time.  Return to the emergency department for worsening pain, persistent vomiting, inability to hydrate orally, fever, and any other new or worrisome symptoms.

## 2024-01-25 NOTE — ED PROVIDER NOTES
Encounter Date: 2024       History     Chief Complaint   Patient presents with    Dysuria     C/O urinary burning and lower back pain.      Patient is a 28-year-old white female who presents to emergency department complaints of dysuria, frequency, left lower back pain,  nausea, and 1 episode of vomiting last night.  She has had no treatment prior to arrival to the ED. her only known past medical history is anxiety and depression which was treated with Zoloft.  She reports compliance with this medication.  She denies fever, chills, abdominal pain, weakness, or any other complaints.  She is afebrile with a temperature 97.9°.  Blood pressure is 106/77, heart rate 89, respirations 15, and oxygen saturation 98% on room air.  She appears in no acute distress.    The history is provided by the patient.     Review of patient's allergies indicates:   Allergen Reactions    Cefprozil      Past Medical History:   Diagnosis Date    Anxiety     Depression      Past Surgical History:   Procedure Laterality Date     SECTION      3  c/s     Family History   Problem Relation Age of Onset    Hypertension Maternal Grandfather     Hypertension Father      Social History     Tobacco Use    Smoking status: Former     Types: Vaping with nicotine     Passive exposure: Current    Smokeless tobacco: Never    Tobacco comments:     Stopped 5 days ago   Substance Use Topics    Alcohol use: Never    Drug use: Never     Review of Systems   Constitutional:  Negative for activity change, appetite change, chills and fever.   HENT:  Negative for congestion, sore throat and trouble swallowing.    Eyes: Negative.    Respiratory:  Negative for cough and shortness of breath.    Cardiovascular:  Negative for chest pain and palpitations.   Gastrointestinal:  Positive for nausea and vomiting (X1 yesterday). Negative for abdominal distention, abdominal pain and diarrhea.   Endocrine: Negative.    Genitourinary:  Positive for dysuria and  frequency.   Musculoskeletal:  Positive for back pain (left lower). Negative for neck pain and neck stiffness.   Skin:  Negative for color change, pallor and rash.   Allergic/Immunologic: Negative.    Neurological:  Negative for dizziness, seizures, syncope, facial asymmetry, speech difficulty, weakness, light-headedness, numbness and headaches.   Hematological:  Does not bruise/bleed easily.   Psychiatric/Behavioral:  Negative for confusion. The patient is not nervous/anxious.    All other systems reviewed and are negative.      Physical Exam     Initial Vitals [01/25/24 0744]   BP Pulse Resp Temp SpO2   106/77 89 15 97.9 °F (36.6 °C) 98 %      MAP       --         Physical Exam    Nursing note and vitals reviewed.  Constitutional: She appears well-developed and well-nourished. She is not diaphoretic. No distress.   HENT:   Head: Normocephalic and atraumatic.   Mouth/Throat: Oropharynx is clear and moist.   Eyes: Conjunctivae and EOM are normal. Pupils are equal, round, and reactive to light.   Neck: Neck supple.   Normal range of motion.  Cardiovascular:  Normal rate, regular rhythm and normal heart sounds.           Pulmonary/Chest: Breath sounds normal. No respiratory distress. She has no wheezes. She has no rhonchi. She has no rales. She exhibits no tenderness.   Abdominal: Abdomen is soft. Bowel sounds are normal. She exhibits no distension. There is no abdominal tenderness.   No right CVA tenderness.  No left CVA tenderness (nontender to palpation.). There is no rebound and no guarding.   Musculoskeletal:         General: Normal range of motion.      Cervical back: Normal range of motion and neck supple.     Neurological: She is alert and oriented to person, place, and time. She has normal strength. GCS score is 15. GCS eye subscore is 4. GCS verbal subscore is 5. GCS motor subscore is 6.   Skin: Skin is warm and dry. Capillary refill takes less than 2 seconds.   Psychiatric: She has a normal mood and affect.  Her behavior is normal. Judgment and thought content normal.         Medical Screening Exam   See Full Note    ED Course   Procedures  Labs Reviewed   URINALYSIS, REFLEX TO URINE CULTURE - Abnormal; Notable for the following components:       Result Value    Leukocytes, UA Trace (*)     All other components within normal limits   URINALYSIS, MICROSCOPIC - Abnormal; Notable for the following components:    WBC, UA 15-25 (*)     Squamous Epithelial Cells, UA Few (*)     Mucus, UA Few (*)     All other components within normal limits   HCG QUALITATIVE URINE - Normal   CULTURE, URINE          Imaging Results    None          Medications   acetaminophen tablet 650 mg (has no administration in time range)   ondansetron disintegrating tablet 4 mg (has no administration in time range)     Medical Decision Making  Patient presents with complaints of dysuria, frequency, left lower back pain, nausea, and 1 episode of vomiting last night.  She is alert and oriented x4.  GCS 15.  She is nontoxic in appearance.  She is tolerating p.o. well at this time.  She was afebrile at the time of triage.  She reports that she has had no treatment prior to arrival to the ED. vital signs are also stable otherwise.  She is nontender on palpation of her lower back and bilateral CVAs.  We will obtain a urinalysis and UPT further evaluation.     Results were reviewed and discussed in detail with the patient.  She is currently playing with her child at the time of re-evaluation.  She remains nontoxic in appearance.  She appears in no acute distress.  Her urinalysis did show trace leukocytes and WBCs of 15 to 25 with few mucus.  A urine culture was reflexed and sent.  We will treat her with Tylenol 650 mg p.o. and Zofran 4 mg ODT here in the emergency department.  We will perform a p.o. challenge to ensure the patient can tolerate oral fluids.  We did discuss the importance of increasing fluids to maintain proper hydration.  She is currently  breastfeeding and is allergic to a known cephalosporin so we will treat her in the interim with Macrobid.  She was informed that the urine was sent for a culture and that we would call her when these results are available his 3rd any changes needed at that time.  The importance of getting a recheck with the primary care provider on Monday was discussed as well.  We reviewed warning signs such as worsening pain, persistent vomiting, inability to hydrate orally, fever, and any other new or worrisome symptoms should prompt her to return to the emergency department for a recheck emergently.  Also mentioned that she should complete all antibiotics even if she feels better.  She verbalizes understanding and is agreement with this plan.    Amount and/or Complexity of Data Reviewed  Independent Historian:      Details: Patient is a 28-year-old white female who presents to emergency department complaints of dysuria, frequency, left lower back pain,  nausea, and 1 episode of vomiting last night.  She has had no treatment prior to arrival to the ED. her only known past medical history is anxiety and depression which was treated with Zoloft.  She reports compliance with this medication.  She denies fever, chills, abdominal pain, weakness, or any other complaints.  She is afebrile with a temperature 97.9°.  Blood pressure is 106/77, heart rate 89, respirations 15, and oxygen saturation 98% on room air.  She appears in no acute distress.  Labs: ordered.     Details: Urinalysis shows trace leukocytes but otherwise unremarkable.  Urine pregnancy test is negative.  Microscopic urinalysis did show WBCs of 15 to 25.  Also mentioned a few mucus and few squamous epithelial cells.  Urine culture was sent and pending.    Risk  Risk Details: Given the large differential diagnosis, the decision making in this case is of high complexity.    After evaluating all of the data points in this case, the presentation of Scarlet Farhan is NOT  consistent with AAA; Mesenteric Ischemia; Bowel Perforation; Bowel Obstruction; Sigmoid Volvulus; Diverticulitis; Appendicitis; Peritonitis; Cholecystitis, ascending cholangitis or other gallbladder disease; perforated ulcer; significant GI bleeding, splenic rupture/infarction; Hepatic abscess; or other surgical/acute abdomen.    Similarly, this presentation is NOT consistent with ACS or Myocardial Ischemia or cardiac etiology; Pulmonary Embolism; fistula; incarcerated hernia; Pancreatitis, Aortic Dissection; Diabetic Ketoacidosis; Kidney Stone; Ischemic colitis; Psoas or other abscess; Methanol poisoning; Heavy metal toxicity; or porphyria.    Similarly, this case is NOT consistent with Vcjh-Xozb-Avmyuq Syndrome, Ectopic Pregnancy, Placental Abruption, PID, Tubo-ovarian abscess, Ovarian Torsion, or STI.    Similarly, this presentation is NOT consistent with acute coronary syndrome, pulmonary embolism, dissection, borhaave's, arrythmia, pneumothorax, cardiac tamponade, or other emergent cardiopulmonary condition.    Similarly, this presentation is NOT consistent with pneumonia, or other focal bacterial infection.    Strict return and follow-up precautions have been given by me personally to the patient/family/caregiver(s).    Data Reviewed/Counseling: I have reviewed the patient's vital signs, nursing notes, and other relevant tests/information. I had a detailed discussion regarding the historical points, exam findings, and any diagnostic results supporting the discharge diagnosis. I also discussed the need for outpatient follow-up and the need to return to the ED if symptoms worsen or if there are any questions or concerns that arise at home.   Final diagnoses:  [N30.00] Acute cystitis without hematuria (Primary)  [R30.0] Dysuria  [R35.0] Urinary frequency  [R11.0] Nausea                                      Clinical Impression:   Final diagnoses:  [N30.00] Acute cystitis without hematuria (Primary)  [R30.0]  Dysuria  [R35.0] Urinary frequency  [R11.0] Nausea        ED Disposition Condition    Discharge Stable          ED Prescriptions       Medication Sig Dispense Start Date End Date Auth. Provider    nitrofurantoin, macrocrystal-monohydrate, (MACROBID) 100 MG capsule Take 1 capsule (100 mg total) by mouth 2 (two) times daily. for 5 days 10 capsule 1/25/2024 1/30/2024 Davide Martinez FNP    ondansetron (ZOFRAN-ODT) 4 MG TbDL Take 1 tablet (4 mg total) by mouth every 8 (eight) hours as needed (nausea/vomiting). 12 tablet 1/25/2024 -- Davide Martinez FNP          Follow-up Information       Follow up With Specialties Details Why Contact Info    Sara Bhatia FNP Family Medicine Call today Schedule an appointment on Monday for a recheck and repeat urinalysis. [address] [place] [address]  [facility] [place] [id number] [phone]               Davide Martinez FNP  01/25/24 0811

## 2024-01-25 NOTE — Clinical Note
"Scarlet "Marcosviji Real was seen and treated in our emergency department on 1/25/2024.  She may return to work on 01/27/2024.       If you have any questions or concerns, please don't hesitate to call.      Davide Martinez, FNP"

## 2024-01-27 LAB — UA COMPLETE W REFLEX CULTURE PNL UR: NORMAL

## 2024-04-23 ENCOUNTER — OFFICE VISIT (OUTPATIENT)
Dept: FAMILY MEDICINE | Facility: CLINIC | Age: 29
End: 2024-04-23

## 2024-04-23 VITALS
HEIGHT: 60 IN | SYSTOLIC BLOOD PRESSURE: 102 MMHG | HEART RATE: 60 BPM | OXYGEN SATURATION: 96 % | RESPIRATION RATE: 12 BRPM | DIASTOLIC BLOOD PRESSURE: 54 MMHG | WEIGHT: 120.63 LBS | BODY MASS INDEX: 23.68 KG/M2

## 2024-04-23 DIAGNOSIS — Z13.29 SCREENING FOR HYPOTHYROIDISM: ICD-10-CM

## 2024-04-23 DIAGNOSIS — Z79.899 HIGH RISK MEDICATION USE: ICD-10-CM

## 2024-04-23 DIAGNOSIS — F41.9 ANXIETY: Primary | ICD-10-CM

## 2024-04-23 DIAGNOSIS — F32.A DEPRESSION, UNSPECIFIED DEPRESSION TYPE: ICD-10-CM

## 2024-04-23 PROCEDURE — 99214 OFFICE O/P EST MOD 30 MIN: CPT | Mod: ,,, | Performed by: NURSE PRACTITIONER

## 2024-04-23 RX ORDER — SERTRALINE HYDROCHLORIDE 50 MG/1
50 TABLET, FILM COATED ORAL DAILY
Qty: 90 TABLET | Refills: 1 | Status: SHIPPED | OUTPATIENT
Start: 2024-04-23

## 2024-04-23 NOTE — PATIENT INSTRUCTIONS
Lab obtained in clinic today, we will notify you of results and any necessary changes to plan of care   Refills on routine medications   Follow up on 10/14/2024 and as needed; routine medication will be due 10/20/2024

## 2024-04-23 NOTE — PROGRESS NOTES
Clinic note     Patient name: Seble Real is a 28 y.o. female   Chief compliant   Chief Complaint   Patient presents with    Depression    Medication Refill       Subjective     History of present illness   In clinic for routine follow up and medication refills     Past Medical History: anxiety, depression    Reports anxiety and depression is well controlled with current medications     LMP: unknown, currently breastfeeding          Social History     Tobacco Use    Smoking status: Former     Types: Vaping with nicotine     Passive exposure: Current    Smokeless tobacco: Never    Tobacco comments:     Stopped 5 days ago   Substance Use Topics    Alcohol use: Never    Drug use: Never       Review of patient's allergies indicates:   Allergen Reactions    Cefprozil        Past Medical History:   Diagnosis Date    Anxiety     Depression        Past Surgical History:   Procedure Laterality Date     SECTION      3  c/s        Family History   Problem Relation Name Age of Onset    Hypertension Maternal Grandfather Muscular dys     Hypertension Father           Current Outpatient Medications:     sertraline (ZOLOFT) 50 MG tablet, Take 1 tablet (50 mg total) by mouth once daily., Disp: 90 tablet, Rfl: 1    Review of Systems   Constitutional:  Negative for appetite change, chills, fatigue, fever and unexpected weight change.   Respiratory:  Negative for cough and shortness of breath.    Cardiovascular:  Negative for chest pain, palpitations and leg swelling.   Gastrointestinal:  Negative for abdominal pain, change in bowel habit, constipation, diarrhea, nausea and vomiting.   Genitourinary:  Negative for dysuria and frequency.   Musculoskeletal:  Negative for arthralgias, gait problem and myalgias.   Neurological:  Negative for dizziness, syncope, light-headedness and headaches.   Psychiatric/Behavioral:  Positive for dysphoric mood (well controlled with current medication). Negative for sleep disturbance. The  patient is nervous/anxious (well controlled with current medication).        Objective     BP (!) 102/54   Pulse 60   Resp 12   Ht 5' (1.524 m)   Wt 54.7 kg (120 lb 9.6 oz)   SpO2 96%   Breastfeeding No   BMI 23.55 kg/m²     Physical Exam   Constitutional: She is oriented to person, place, and time. No distress.   HENT:   Head: Atraumatic.   Mouth/Throat: Mucous membranes are moist.   Cardiovascular: Normal rate and regular rhythm. Pulmonary:      Effort: Pulmonary effort is normal. No respiratory distress.      Breath sounds: Normal breath sounds. No wheezing, rhonchi or rales.     Abdominal: Soft. Bowel sounds are normal. She exhibits no distension. There is no abdominal tenderness.   Musculoskeletal:         General: Normal range of motion.      Cervical back: Neck supple.      Right lower leg: No edema.      Left lower leg: No edema.   Neurological: She is alert and oriented to person, place, and time. Gait normal.   Skin: Skin is warm and dry.   Psychiatric: Her behavior is normal. Mood normal.       Lab Results   Component Value Date    WBC 3.46 (L) 12/18/2022    HGB 12.3 12/18/2022    HCT 38.7 12/18/2022    MCV 71.0 (L) 12/18/2022     12/18/2022       CMP  Sodium   Date Value Ref Range Status   12/18/2022 141 136 - 145 mmol/L Final     Potassium   Date Value Ref Range Status   12/18/2022 4.2 3.5 - 5.1 mmol/L Final     Chloride   Date Value Ref Range Status   12/18/2022 102 98 - 107 mmol/L Final     CO2   Date Value Ref Range Status   12/18/2022 28 21 - 32 mmol/L Final     Glucose   Date Value Ref Range Status   03/09/2023 93 74 - 106 mg/dL Final     BUN   Date Value Ref Range Status   12/18/2022 16 7 - 18 mg/dL Final     Creatinine   Date Value Ref Range Status   12/18/2022 0.74 0.55 - 1.02 mg/dL Final     Calcium   Date Value Ref Range Status   12/18/2022 8.3 (L) 8.5 - 10.1 mg/dL Final     Total Protein   Date Value Ref Range Status   12/18/2022 7.5 6.4 - 8.2 g/dL Final     Albumin   Date Value  "Ref Range Status   12/18/2022 4.1 3.5 - 5.0 g/dL Final     Bilirubin, Total   Date Value Ref Range Status   12/18/2022 0.7 >0.0 - 1.2 mg/dL Final     Alk Phos   Date Value Ref Range Status   12/18/2022 81 37 - 98 U/L Final     AST   Date Value Ref Range Status   12/18/2022 8 (L) 15 - 37 U/L Final     ALT   Date Value Ref Range Status   12/18/2022 14 13 - 56 U/L Final     Anion Gap   Date Value Ref Range Status   12/18/2022 15 7 - 16 mmol/L Final     No results found for: "TSH"  Lab Results   Component Value Date    CHOL 165 03/09/2023    CHOL 151 03/08/2022     Lab Results   Component Value Date    HDL 60 03/09/2023    HDL 47 03/08/2022     Lab Results   Component Value Date    LDLCALC 94 03/09/2023    LDLCALC 89 03/08/2022     Lab Results   Component Value Date    TRIG 56 03/09/2023    TRIG 75 03/08/2022     Lab Results   Component Value Date    CHOLHDL 2.8 03/09/2023    CHOLHDL 3.2 03/08/2022     No results found for: "LABA1C", "HGBA1C"      Assessment and Plan   Anxiety  -     TSH; Future; Expected date: 04/23/2024  -     sertraline (ZOLOFT) 50 MG tablet; Take 1 tablet (50 mg total) by mouth once daily.  Dispense: 90 tablet; Refill: 1    Depression, unspecified depression type  -     CBC Auto Differential; Future; Expected date: 04/23/2024  -     Comprehensive Metabolic Panel; Future; Expected date: 04/23/2024  -     TSH; Future; Expected date: 04/23/2024  -     sertraline (ZOLOFT) 50 MG tablet; Take 1 tablet (50 mg total) by mouth once daily.  Dispense: 90 tablet; Refill: 1    Screening for hypothyroidism  -     TSH; Future; Expected date: 04/23/2024    High risk medication use  -     CBC Auto Differential; Future; Expected date: 04/23/2024  -     Comprehensive Metabolic Panel; Future; Expected date: 04/23/2024          Patient Instructions  Patient Instructions   Lab obtained in clinic today, we will notify you of results and any necessary changes to plan of care   Refills on routine medications   Follow up on " 10/14/2024 and as needed; routine medication will be due 10/20/2024

## 2024-06-18 ENCOUNTER — LAB VISIT (OUTPATIENT)
Dept: PRIMARY CARE CLINIC | Facility: CLINIC | Age: 29
End: 2024-06-18

## 2024-06-18 DIAGNOSIS — Z02.83 ENCOUNTER FOR DRUG SCREENING: ICD-10-CM

## 2024-06-18 DIAGNOSIS — Z11.1 SCREENING-PULMONARY TB: Primary | ICD-10-CM

## 2024-06-18 PROCEDURE — 86580 TB INTRADERMAL TEST: CPT | Mod: ,,, | Performed by: NURSE PRACTITIONER

## 2024-06-18 PROCEDURE — 99000 SPECIMEN HANDLING OFFICE-LAB: CPT | Mod: ,,, | Performed by: NURSE PRACTITIONER

## 2024-06-18 NOTE — PROGRESS NOTES
Subjective     Patient ID: Seble Real is a 28 y.o. female.    Chief Complaint: No chief complaint on file.    HPI  Review of Systems       Objective     Physical Exam       Assessment and Plan     1. Screening-pulmonary TB  -     POCT TB Skin Test Read    2. Encounter for drug screening        Drug testing only  TB skin test only             No follow-ups on file.

## 2024-06-20 LAB
TB INDURATION - 48 HR READ: NORMAL
TB INDURATION - 72 HR READ: NORMAL
TB SKIN TEST - 48 HR READ: NEGATIVE
TB SKIN TEST - 72 HR READ: NORMAL

## 2024-06-25 ENCOUNTER — LAB VISIT (OUTPATIENT)
Dept: PRIMARY CARE CLINIC | Facility: CLINIC | Age: 29
End: 2024-06-25

## 2024-06-25 DIAGNOSIS — Z11.1 SCREENING-PULMONARY TB: Primary | ICD-10-CM

## 2024-06-25 PROCEDURE — 86580 TB INTRADERMAL TEST: CPT | Mod: ,,, | Performed by: NURSE PRACTITIONER

## 2024-06-25 NOTE — PROGRESS NOTES
Subjective     Patient ID: Seble Real is a 28 y.o. female.    Chief Complaint: No chief complaint on file.    HPI  Review of Systems       Objective     Physical Exam       Assessment and Plan     1. Screening-pulmonary TB  -     POCT TB Skin Test Read        TB skin test only             No follow-ups on file.

## 2024-08-11 ENCOUNTER — HOSPITAL ENCOUNTER (EMERGENCY)
Facility: HOSPITAL | Age: 29
Discharge: HOME | End: 2024-08-11

## 2024-08-11 VITALS
HEIGHT: 60 IN | HEART RATE: 53 BPM | BODY MASS INDEX: 23.16 KG/M2 | RESPIRATION RATE: 18 BRPM | TEMPERATURE: 98 F | SYSTOLIC BLOOD PRESSURE: 112 MMHG | WEIGHT: 118 LBS | OXYGEN SATURATION: 97 % | DIASTOLIC BLOOD PRESSURE: 64 MMHG

## 2024-08-11 DIAGNOSIS — R52 BODY ACHES: Primary | ICD-10-CM

## 2024-08-11 LAB
BILIRUB UR QL STRIP: NEGATIVE
CLARITY UR: CLEAR
COLOR UR: YELLOW
GLUCOSE UR STRIP-MCNC: NEGATIVE MG/DL
HCG UR QL IA.RAPID: NEGATIVE
KETONES UR STRIP-SCNC: NEGATIVE MG/DL
LEUKOCYTE ESTERASE UR QL STRIP: NEGATIVE
NITRITE UR QL STRIP: NEGATIVE
PH UR STRIP: 7 PH UNITS
PROT UR QL STRIP: NEGATIVE
RBC # UR STRIP: NEGATIVE /UL
SP GR UR STRIP: 1.01
UROBILINOGEN UR STRIP-ACNC: 0.2 MG/DL

## 2024-08-11 PROCEDURE — 81025 URINE PREGNANCY TEST: CPT | Performed by: NURSE PRACTITIONER

## 2024-08-11 PROCEDURE — 99282 EMERGENCY DEPT VISIT SF MDM: CPT

## 2024-08-11 PROCEDURE — 99283 EMERGENCY DEPT VISIT LOW MDM: CPT | Mod: ,,, | Performed by: NURSE PRACTITIONER

## 2024-08-11 PROCEDURE — 81003 URINALYSIS AUTO W/O SCOPE: CPT | Performed by: NURSE PRACTITIONER

## 2024-08-11 NOTE — ED PROVIDER NOTES
Encounter Date: 2024       History     Chief Complaint   Patient presents with    Abdominal Pain     Patient comes in with concerns for epigastric pain , states worse with touch or deep breath. Also c/o lower abdominal pain, states she just hurts all over. Denies nausea, denies vomiting, denies dysuria, denies diarrhea.      Patient presents to the ED with complaints of general body aches, no fever, no cough, no congestion, denies N/V/D, reports some pain in lower back and lower abdominal area. Denies any urinary symptoms. Denies any injury. Reports the body aches feel like the flu.     The history is provided by the patient.     Review of patient's allergies indicates:   Allergen Reactions    Cefprozil      Past Medical History:   Diagnosis Date    Anxiety     Depression      Past Surgical History:   Procedure Laterality Date     SECTION      3  c/s     Family History   Problem Relation Name Age of Onset    Hypertension Maternal Grandfather Muscular dys     Hypertension Father       Social History     Tobacco Use    Smoking status: Former     Types: Vaping with nicotine     Passive exposure: Current    Smokeless tobacco: Never    Tobacco comments:     Stopped 5 days ago   Substance Use Topics    Alcohol use: Never    Drug use: Never     Review of Systems   Constitutional: Negative.    HENT: Negative.     Respiratory: Negative.     Cardiovascular: Negative.    Gastrointestinal:  Positive for abdominal pain (lower).   Musculoskeletal:  Positive for back pain and myalgias.   Skin: Negative.    Neurological: Negative.    Psychiatric/Behavioral: Negative.     All other systems reviewed and are negative.      Physical Exam     Initial Vitals [24 1152]   BP Pulse Resp Temp SpO2   112/64 (!) 53 18 98 °F (36.7 °C) 97 %      MAP       --         Physical Exam    Vitals reviewed.  Constitutional: She appears well-developed and well-nourished.   Cardiovascular:  Normal rate, regular rhythm, normal heart  sounds and intact distal pulses.           Pulmonary/Chest: Breath sounds normal.   Abdominal: Abdomen is soft. Bowel sounds are normal.   Musculoskeletal:         General: Normal range of motion.     Neurological: She is alert and oriented to person, place, and time. She has normal strength. GCS score is 15. GCS eye subscore is 4. GCS verbal subscore is 5. GCS motor subscore is 6.   Skin: Skin is warm and dry. Capillary refill takes less than 2 seconds.   Psychiatric: She has a normal mood and affect. Her behavior is normal. Judgment and thought content normal.         Medical Screening Exam   See Full Note    ED Course   Procedures  Labs Reviewed   HCG QUALITATIVE URINE - Normal       Result Value    HCG Qualitative, Urine Negative     URINALYSIS, REFLEX TO URINE CULTURE    Color, UA Yellow      Clarity, UA Clear      pH, UA 7.0      Leukocytes, UA Negative      Nitrites, UA Negative      Protein, UA Negative      Glucose, UA Negative      Ketones, UA Negative      Urobilinogen, UA 0.2      Bilirubin, UA Negative      Blood, UA Negative      Specific Gravity, UA 1.010            Imaging Results    None          Medications - No data to display  Medical Decision Making  MDM    Patient presents for emergent evaluation of acute body aches with some lower abdomina and lower back pain that poses a threat to life and/or bodily function.    In the ED patient found to have acute body aches.    I ordered labs and personally reviewed them.  Labs significant for negative urine pregnancy, negative urine for infection or blood.     Discharge MDM  I discussed the treatment and discharge plan with the patient. Patient advised that she may be coming down with a virus but it may be early on. Patient advised to follow up with PCP in the morning.   Patient was discharged in stable condition.  Detailed return precautions discussed.                                      Clinical Impression:   Final diagnoses:  [R52] Body aches  (Primary)        ED Disposition Condition    Discharge Stable          ED Prescriptions    None       Follow-up Information       Follow up With Specialties Details Why Contact Info    Sara Bhatia FNP Family Medicine In 1 day  [address]  [facility] [place] [id number] [phone]               Shayy Christine FNP  08/11/24 [id number]

## 2024-10-21 ENCOUNTER — OFFICE VISIT (OUTPATIENT)
Dept: FAMILY MEDICINE | Facility: CLINIC | Age: 29
End: 2024-10-21
Payer: MEDICAID

## 2024-10-21 VITALS
HEIGHT: 60 IN | HEART RATE: 59 BPM | OXYGEN SATURATION: 99 % | DIASTOLIC BLOOD PRESSURE: 59 MMHG | WEIGHT: 123.69 LBS | BODY MASS INDEX: 24.28 KG/M2 | SYSTOLIC BLOOD PRESSURE: 95 MMHG

## 2024-10-21 DIAGNOSIS — Z34.90 PREGNANCY, UNSPECIFIED GESTATIONAL AGE: ICD-10-CM

## 2024-10-21 DIAGNOSIS — F17.200 CURRENT EVERY DAY SMOKER: Chronic | ICD-10-CM

## 2024-10-21 DIAGNOSIS — F32.A DEPRESSION, UNSPECIFIED DEPRESSION TYPE: Chronic | ICD-10-CM

## 2024-10-21 DIAGNOSIS — F41.9 ANXIETY: Primary | Chronic | ICD-10-CM

## 2024-10-21 PROCEDURE — 3008F BODY MASS INDEX DOCD: CPT | Mod: CPTII,,, | Performed by: NURSE PRACTITIONER

## 2024-10-21 PROCEDURE — 1159F MED LIST DOCD IN RCRD: CPT | Mod: CPTII,,, | Performed by: NURSE PRACTITIONER

## 2024-10-21 PROCEDURE — 99214 OFFICE O/P EST MOD 30 MIN: CPT | Mod: ,,, | Performed by: NURSE PRACTITIONER

## 2024-10-21 PROCEDURE — 3078F DIAST BP <80 MM HG: CPT | Mod: CPTII,,, | Performed by: NURSE PRACTITIONER

## 2024-10-21 PROCEDURE — 3074F SYST BP LT 130 MM HG: CPT | Mod: CPTII,,, | Performed by: NURSE PRACTITIONER

## 2024-10-21 PROCEDURE — 1160F RVW MEDS BY RX/DR IN RCRD: CPT | Mod: CPTII,,, | Performed by: NURSE PRACTITIONER

## 2024-10-21 RX ORDER — SERTRALINE HYDROCHLORIDE 50 MG/1
50 TABLET, FILM COATED ORAL DAILY
Qty: 90 TABLET | Refills: 0 | Status: SHIPPED | OUTPATIENT
Start: 2024-10-21

## 2024-10-21 NOTE — PROGRESS NOTES
Clinic note     Patient name: Seble Real is a 29 y.o. female   Chief compliant   Chief Complaint   Patient presents with    Medication Refill     Here for med refills. No problems at this time.  Pt is pregnant has first OB/GYN appt on Wednesday.        Subjective     History of present illness   In clinic for routine follow up and medication refills     Past Medical History: anxiety, depression     Reports anxiety and depression is well controlled with current medications      LMP: 2024, currently pregnant, first OB visit scheduled for Wednesday 10/23/24; instructed to discuss continuation of Zoloft with OB provider; she did continue Zoloft during her last two pregnancies    She will be followed by Abhishek COLVIN           Social History     Tobacco Use    Smoking status: Former     Types: Vaping with nicotine     Passive exposure: Current    Smokeless tobacco: Never    Tobacco comments:     Stopped 5 days ago   Substance Use Topics    Alcohol use: Never    Drug use: Never       Review of patient's allergies indicates:   Allergen Reactions    Cefprozil        Past Medical History:   Diagnosis Date    Anxiety     Depression        Past Surgical History:   Procedure Laterality Date     SECTION      3  c/s        Family History   Problem Relation Name Age of Onset    Hypertension Maternal Grandfather Muscular dys     Hypertension Father           Current Outpatient Medications:     sertraline (ZOLOFT) 50 MG tablet, Take 1 tablet (50 mg total) by mouth once daily., Disp: 90 tablet, Rfl: 0    Review of Systems   Constitutional:  Negative for appetite change, chills, fatigue, fever and unexpected weight change.   Respiratory:  Negative for cough and shortness of breath.    Cardiovascular:  Negative for chest pain, palpitations and leg swelling.   Gastrointestinal:  Negative for abdominal pain, change in bowel habit, constipation, diarrhea, nausea and vomiting.   Genitourinary:  Negative for dysuria  and frequency.   Musculoskeletal:  Negative for arthralgias, gait problem and myalgias.   Neurological:  Negative for dizziness, syncope, light-headedness and headaches.   Psychiatric/Behavioral:  Negative for dysphoric mood and sleep disturbance. The patient is not nervous/anxious.        Objective     BP (!) 95/59   Pulse (!) 59   Ht 5' (1.524 m)   Wt 56.1 kg (123 lb 11.2 oz)   LMP 08/26/2024   SpO2 99%   BMI 24.16 kg/m²     Physical Exam   Constitutional: She is oriented to person, place, and time. She is cooperative. No distress.   HENT:   Head: Atraumatic.   Mouth/Throat: Mucous membranes are moist.   Cardiovascular: Normal rate and regular rhythm. Pulmonary:      Effort: Pulmonary effort is normal. No respiratory distress.      Breath sounds: Normal breath sounds. No wheezing, rhonchi or rales.     Abdominal: Soft. Bowel sounds are normal. She exhibits no distension. There is no abdominal tenderness.   Musculoskeletal:         General: Normal range of motion.      Cervical back: Neck supple.      Right lower leg: No edema.      Left lower leg: No edema.   Neurological: She is alert and oriented to person, place, and time. Gait normal.   Skin: Skin is warm and dry.   Psychiatric: Her speech is normal and behavior is normal. Mood, affect and thought content normal. Thought content is not paranoid. She expresses no homicidal and no suicidal ideation. She expresses no suicidal plans and no homicidal plans.       Lab Results   Component Value Date    WBC 4.71 04/26/2024    HGB 12.9 04/26/2024    HCT 41.0 04/26/2024    MCV 83.7 04/26/2024     04/26/2024       CMP  Sodium   Date Value Ref Range Status   04/26/2024 142 136 - 145 mmol/L Final     Potassium   Date Value Ref Range Status   04/26/2024 3.8 3.5 - 5.1 mmol/L Final     Chloride   Date Value Ref Range Status   04/26/2024 109 (H) 98 - 107 mmol/L Final     CO2   Date Value Ref Range Status   04/26/2024 26 21 - 32 mmol/L Final     Glucose   Date  "Value Ref Range Status   04/26/2024 66 (L) 74 - 106 mg/dL Final     BUN   Date Value Ref Range Status   04/26/2024 16 7 - 18 mg/dL Final     Creatinine   Date Value Ref Range Status   04/26/2024 0.68 0.55 - 1.02 mg/dL Final     Calcium   Date Value Ref Range Status   04/26/2024 8.7 8.5 - 10.1 mg/dL Final     Total Protein   Date Value Ref Range Status   04/26/2024 7.1 6.4 - 8.2 g/dL Final     Albumin   Date Value Ref Range Status   04/26/2024 4.2 3.5 - 5.0 g/dL Final     Bilirubin, Total   Date Value Ref Range Status   04/26/2024 0.4 >0.0 - 1.2 mg/dL Final     Alk Phos   Date Value Ref Range Status   04/26/2024 83 37 - 98 U/L Final     AST   Date Value Ref Range Status   04/26/2024 19 15 - 37 U/L Final     ALT   Date Value Ref Range Status   04/26/2024 20 13 - 56 U/L Final     Anion Gap   Date Value Ref Range Status   04/26/2024 11 7 - 16 mmol/L Final     Lab Results   Component Value Date    TSH 0.761 04/26/2024     Lab Results   Component Value Date    CHOL 165 03/09/2023    CHOL 151 03/08/2022     Lab Results   Component Value Date    HDL 60 03/09/2023    HDL 47 03/08/2022     Lab Results   Component Value Date    LDLCALC 94 03/09/2023    LDLCALC 89 03/08/2022     Lab Results   Component Value Date    TRIG 56 03/09/2023    TRIG 75 03/08/2022     Lab Results   Component Value Date    CHOLHDL 2.8 03/09/2023    CHOLHDL 3.2 03/08/2022     No results found for: "LABA1C", "HGBA1C"      Assessment and Plan   Anxiety  -     sertraline (ZOLOFT) 50 MG tablet; Take 1 tablet (50 mg total) by mouth once daily.  Dispense: 90 tablet; Refill: 0    Depression, unspecified depression type  -     sertraline (ZOLOFT) 50 MG tablet; Take 1 tablet (50 mg total) by mouth once daily.  Dispense: 90 tablet; Refill: 0    Current every day smoker    Pregnancy, unspecified gestational age          Patient Instructions  Patient Instructions   Refill on routine medication  Discuss continuation of Zoloft with OB at visit on " Wednesday

## 2024-12-01 ENCOUNTER — HOSPITAL ENCOUNTER (EMERGENCY)
Facility: HOSPITAL | Age: 29
Discharge: HOME | End: 2024-12-01
Payer: MEDICAID

## 2024-12-01 VITALS
SYSTOLIC BLOOD PRESSURE: 111 MMHG | OXYGEN SATURATION: 99 % | WEIGHT: 122 LBS | RESPIRATION RATE: 18 BRPM | HEART RATE: 69 BPM | DIASTOLIC BLOOD PRESSURE: 62 MMHG | BODY MASS INDEX: 23.95 KG/M2 | TEMPERATURE: 98 F | HEIGHT: 60 IN

## 2024-12-01 DIAGNOSIS — R31.9 HEMATURIA, UNSPECIFIED TYPE: ICD-10-CM

## 2024-12-01 DIAGNOSIS — R35.0 URINARY FREQUENCY: Primary | ICD-10-CM

## 2024-12-01 DIAGNOSIS — Z34.90 PREGNANCY, UNSPECIFIED GESTATIONAL AGE: ICD-10-CM

## 2024-12-01 DIAGNOSIS — R39.89 SENSATION OF PRESSURE IN BLADDER AREA: ICD-10-CM

## 2024-12-01 LAB
AMORPH PHOS CRY #/AREA URNS LPF: ABNORMAL /LPF
BACTERIA #/AREA URNS HPF: ABNORMAL /HPF
BILIRUB UR QL STRIP: NEGATIVE
CLARITY UR: ABNORMAL
COLOR UR: YELLOW
GLUCOSE UR STRIP-MCNC: NEGATIVE MG/DL
HCG UR QL IA.RAPID: POSITIVE
KETONES UR STRIP-SCNC: NEGATIVE MG/DL
LEUKOCYTE ESTERASE UR QL STRIP: NEGATIVE
NITRITE UR QL STRIP: NEGATIVE
PH UR STRIP: 7 PH UNITS
PROT UR QL STRIP: NEGATIVE
RBC # UR STRIP: ABNORMAL /UL
RBC #/AREA URNS HPF: ABNORMAL /HPF
SP GR UR STRIP: 1.02
SQUAMOUS #/AREA URNS LPF: ABNORMAL /LPF
UROBILINOGEN UR STRIP-ACNC: 0.2 MG/DL
WBC #/AREA URNS HPF: ABNORMAL /HPF

## 2024-12-01 PROCEDURE — 81003 URINALYSIS AUTO W/O SCOPE: CPT

## 2024-12-01 PROCEDURE — 81025 URINE PREGNANCY TEST: CPT

## 2024-12-01 PROCEDURE — 99284 EMERGENCY DEPT VISIT MOD MDM: CPT

## 2024-12-01 PROCEDURE — 99284 EMERGENCY DEPT VISIT MOD MDM: CPT | Mod: GF,,,

## 2024-12-01 RX ORDER — AMOXICILLIN 500 MG/1
500 TABLET, FILM COATED ORAL EVERY 12 HOURS
Qty: 14 TABLET | Refills: 0 | Status: SHIPPED | OUTPATIENT
Start: 2024-12-01 | End: 2024-12-08

## 2024-12-01 RX ORDER — ACETAMINOPHEN 325 MG/1
650 TABLET ORAL
Status: DISCONTINUED | OUTPATIENT
Start: 2024-12-01 | End: 2024-12-01 | Stop reason: HOSPADM

## 2024-12-01 NOTE — DISCHARGE INSTRUCTIONS
"Suspect kidney stones based off of hematuria and amorphous crystals in your urine sample as well as your history and physical exam. Acute cystitis is also in the differential as well. No active notable infection outside of "few" bacteria on your urinalysis today. Due to your current pregnancy status, we still recommend Amoxicillin since you have an allergy to cephalosporin medications. Since you are pregnant,  we have also discussed and agreed that the risk of CT imaging would not be worth the benefits at this given time. Recommend following up with your primary care provider/Ob-Gyn tomorrow to discuss alternative treatment options such as Ultrasound for further evaluation and to have a recheck performed at that time. Recommend over the counter tylenol as directed for pain control. Return to the emergency department for fever, chills, uncontrolled pain, vomiting, inability to urinate, vomiting, vaginal bleeding, vaginal discharge, or any other new or worrisome symptoms.   "

## 2024-12-01 NOTE — ED PROVIDER NOTES
"Encounter Date: 2024       History     Chief Complaint   Patient presents with    Dysuria     Pt c/o urinary burning and frequency. Has not taken anything for the pain. States she is 13wks pregnant. States she sees Elaina Bermudez in [place] for OB     29-year-old female who presents to emergency department for evaluation of intermittent suprapubic pressure and urinary frequency that began yesterday.  She does report that she was currently 13 weeks pregnant.  Denies any dysuria, back pain, fever, chills, vomiting, weakness, gross hematuria, vaginal discharge, vaginal bleeding, or any other complaints.  She denies any discomfort at this time.  She was had no treatment prior to arrival to the ED. blood pressure 111/62, temperature 98°, heart rate 69, respirations 18, and oxygen saturation 99% on room air.  She appears in no immediate distress.    The history is provided by the patient.     Review of patient's allergies indicates:   Allergen Reactions    Cefprozil      Past Medical History:   Diagnosis Date    Anxiety     Depression      Past Surgical History:   Procedure Laterality Date     SECTION      3  c/s     Family History   Problem Relation Name Age of Onset    Hypertension Maternal Grandfather Muscular dys     Hypertension Father       Social History     Tobacco Use    Smoking status: Former     Types: Vaping with nicotine     Passive exposure: Current    Smokeless tobacco: Never    Tobacco comments:     Stopped 5 days ago   Substance Use Topics    Alcohol use: Never    Drug use: Never     Review of Systems   Constitutional:  Negative for activity change, appetite change, chills and fever.   HENT:  Negative for congestion and sore throat.    Eyes: Negative.    Respiratory:  Negative for cough and shortness of breath.    Cardiovascular:  Negative for chest pain and palpitations.   Gastrointestinal:  Positive for abdominal pain (intermittent suprapubic "pressure" after voiding but denies at " current). Negative for abdominal distention, diarrhea, nausea and vomiting.   Endocrine: Negative.    Genitourinary:  Positive for frequency. Negative for dysuria, hematuria, vaginal bleeding, vaginal discharge and vaginal pain.   Musculoskeletal:  Negative for arthralgias, back pain, gait problem, neck pain and neck stiffness.   Skin:  Negative for color change, pallor and rash.   Allergic/Immunologic: Negative.    Neurological:  Negative for dizziness, syncope, weakness, numbness and headaches.   Hematological:  Does not bruise/bleed easily.   Psychiatric/Behavioral:  Negative for confusion. The patient is not nervous/anxious.    All other systems reviewed and are negative.      Physical Exam     Initial Vitals [12/01/24 1516]   BP Pulse Resp Temp SpO2   111/62 69 18 98 °F (36.7 °C) 99 %      MAP       --         Physical Exam    Nursing note and vitals reviewed.  Constitutional: Vital signs are normal. She appears well-developed and well-nourished. She is not diaphoretic. She is active and cooperative.  Non-toxic appearance. She does not appear ill. No distress.   HENT:   Head: Normocephalic and atraumatic.   Right Ear: External ear normal.   Left Ear: External ear normal.   Nose: Nose normal. Mouth/Throat: Oropharynx is clear and moist.   Eyes: Conjunctivae and EOM are normal. Pupils are equal, round, and reactive to light.   Neck: Neck supple.   Normal range of motion.   Full passive range of motion without pain.     Cardiovascular:  Normal rate, regular rhythm, S1 normal, S2 normal, normal heart sounds and normal pulses.           Pulmonary/Chest: Effort normal and breath sounds normal. No respiratory distress. She has no wheezes. She has no rhonchi. She has no rales. She exhibits no tenderness.   Abdominal: Abdomen is soft. Bowel sounds are normal. She exhibits no distension. There is no abdominal tenderness. No hernia.   No right CVA tenderness.  No left CVA tenderness. There is no rebound, no guarding, no  tenderness at McBurney's point and negative Chew's sign.   Musculoskeletal:         General: Normal range of motion.      Cervical back: Full passive range of motion without pain, normal range of motion and neck supple.     Neurological: She is alert and oriented to person, place, and time. She has normal strength. GCS score is 15. GCS eye subscore is 4. GCS verbal subscore is 5. GCS motor subscore is 6.   Skin: Skin is warm and dry. Capillary refill takes less than 2 seconds.   Psychiatric: She has a normal mood and affect. Her behavior is normal. Judgment and thought content normal.         Medical Screening Exam   See Full Note    ED Course   Procedures  Labs Reviewed   URINALYSIS, REFLEX TO URINE CULTURE - Abnormal       Result Value    Color, UA Yellow      Clarity, UA Cloudy      pH, UA 7.0      Leukocytes, UA Negative      Nitrites, UA Negative      Protein, UA Negative      Glucose, UA Negative      Ketones, UA Negative      Urobilinogen, UA 0.2      Bilirubin, UA Negative      Blood, UA Trace-Intact (*)     Specific Gravity, UA 1.020     HCG QUALITATIVE URINE - Abnormal    HCG Qualitative, Urine Positive (*)    URINALYSIS, MICROSCOPIC - Abnormal    WBC, UA 0-5      RBC, UA 3-5 (*)     Bacteria, UA Few (*)     Squamous Epithelial Cells, UA Few (*)     Amorphous Crystals, UA Few (*)           Imaging Results    None          Medications   acetaminophen tablet 650 mg (650 mg Oral Not Given 12/1/24 3815)     Medical Decision Making  Thirteen week pregnant female presents for evaluation of intermittent suprapubic pressure after voiding and urinary frequency that began yesterday.  She denies any dysuria, back pain, fever, chills, vomiting, weakness, gross hematuria, vaginal discharge, vaginal bleeding, or any other complaints.  She denies any discomfort at current.  She was alert and oriented x4.  GCS 15.  Vital signs stable.  Currently afebrile with a temperature 98°.  She was nontoxic in appearance.  She is  tolerating p.o. without any vomiting or diarrhea.  Urinalysis was performed and shows no significant indication of urinary tract infection.  Her pregnancy test was positive as she reported.  Microscopic urinalysis shows 0-5 WBCs, 3-5 RBCs, few bacteria, and few amorphous crystals.  Results were discussed in detail with the patient.  We did explain that we suspect that she may have a kidney stone but do not feel that given the subtlety of her symptoms and nontoxic state that a CT scan would be beneficial and outweigh the risks at this time.  She verbalizes understanding and agreed.  Since she was having frequency, possible kidney stones, and currently pregnant we did discuss low-dose antibiotic therapy for treatment of potential acute cystitis.  She reports that she has had an allergic reaction to cephalosporin medication in the past which resulted in significant swelling so we will avoid this medication class.  She has tolerated amoxicillin without any complications that since there was no active infection we will prescribe amoxicillin b.i.d. x7 days.  She does have establish primary and OBGYN care it was confident that her OBGYN provider we will be able to get her in tomorrow for a recheck.  We did discuss her having an ultrasound for further evaluation to rule out kidney stones or other apparent causes but informed her that we do not have the ultrasound capability here at this time.  Given her current vital signs of physical exam, it was not felt that it would be medically necessary to transfer her emergently to obtain the ultrasound at this time but we did discuss with her red flag warning signs that should prompt her to return to the ED for more emergent evaluation and we will provide written instructions as well.  We did recommend Tylenol but she declines at this time and states that she will take some if the discomfort returns.  We did discuss the importance of proper hydration.  All questions were answered.   Patient verbalizes understanding and is in agreement with this plan.    Amount and/or Complexity of Data Reviewed  Independent Historian:      Details: 29-year-old female who presents to emergency department for evaluation of intermittent suprapubic pressure and urinary frequency that began yesterday.  She does report that she was currently 13 weeks pregnant.  Denies any dysuria, back pain, fever, chills, vomiting, weakness, gross hematuria, vaginal discharge, vaginal bleeding, or any other complaints.  She denies any discomfort at this time.  She was had no treatment prior to arrival to the ED. blood pressure 111/62, temperature 98°, heart rate 69, respirations 18, and oxygen saturation 99% on room air.  She appears in no immediate distress.  Labs: ordered.     Details: Urine pregnancy test positive.  Urinalysis shows trace blood but otherwise unremarkable.  Microscopic urinalysis shows 0-5 WBC, 3-5 RBC, few bacteria, few squamous epithelial cells, and few amorphous crystals.    Risk  OTC drugs.  Prescription drug management.  Risk Details: Patient presents for emergent evaluation of acute intermittent suprapubic pressure and frequency that poses a threat to life and/or bodily function.    Final diagnoses:  [R35.0] Urinary frequency (Primary)  [R39.89] Sensation of pressure in bladder area  [Z34.90] Pregnancy, unspecified gestational age  [R31.9] Hematuria, unspecified type  I ordered labs and personally reviewed them.   Treatment options, suspected diagnoses, home care, follow up, and strict ED return precautions discussed in detail with her at this time.  She verbalizes understanding and is in agreement with this plan.  We did offer Tylenol but the patient declines and reports that she was not hurting at this time.  Patient was discharged in stable condition.  Detailed return precautions discussed.                                       Clinical Impression:   Final diagnoses:  [R35.0] Urinary frequency  (Primary)  [R39.89] Sensation of pressure in bladder area  [Z34.90] Pregnancy, unspecified gestational age  [R31.9] Hematuria, unspecified type        ED Disposition Condition    Discharge Stable          ED Prescriptions       Medication Sig Dispense Start Date End Date Auth. Provider    amoxicillin (AMOXIL) 500 MG Tab Take 1 tablet (500 mg total) by mouth every 12 (twelve) hours. for 7 days 14 tablet 12/1/2024 12/8/2024 Davide Martinez FNP          Follow-up Information       Follow up With Specialties Details Why Contact Info    Sara Bhatia FNP Family Medicine Schedule an appointment as soon as possible for a visit in 2 days  [address] [facility]  [facility] [place] [address]  [phone]      Sophie Bermudez CNM Certified Nurse Midwife Go on 12/2/2024  [address] [place] [address] [place] [address]  [phone]               Davide Martinez FNP  12/01/24 6012

## 2025-07-07 ENCOUNTER — HOSPITAL ENCOUNTER (EMERGENCY)
Facility: HOSPITAL | Age: 30
Discharge: HOME | End: 2025-07-07
Payer: MEDICAID

## 2025-07-07 VITALS
HEART RATE: 56 BPM | BODY MASS INDEX: 26.31 KG/M2 | TEMPERATURE: 98 F | RESPIRATION RATE: 15 BRPM | OXYGEN SATURATION: 100 % | DIASTOLIC BLOOD PRESSURE: 76 MMHG | WEIGHT: 134 LBS | HEIGHT: 60 IN | SYSTOLIC BLOOD PRESSURE: 106 MMHG

## 2025-07-07 DIAGNOSIS — S83.91XA SPRAIN OF RIGHT KNEE, UNSPECIFIED LIGAMENT, INITIAL ENCOUNTER: Primary | ICD-10-CM

## 2025-07-07 PROCEDURE — 63600175 PHARM REV CODE 636 W HCPCS: Mod: UD | Performed by: NURSE PRACTITIONER

## 2025-07-07 PROCEDURE — 99284 EMERGENCY DEPT VISIT MOD MDM: CPT | Mod: ,,, | Performed by: NURSE PRACTITIONER

## 2025-07-07 PROCEDURE — 96372 THER/PROPH/DIAG INJ SC/IM: CPT | Performed by: NURSE PRACTITIONER

## 2025-07-07 RX ORDER — KETOROLAC TROMETHAMINE 30 MG/ML
15 INJECTION, SOLUTION INTRAMUSCULAR; INTRAVENOUS
Status: COMPLETED | OUTPATIENT
Start: 2025-07-07 | End: 2025-07-07

## 2025-07-07 RX ADMIN — KETOROLAC TROMETHAMINE 15 MG: 30 INJECTION, SOLUTION INTRAMUSCULAR at 08:07

## 2025-07-07 NOTE — ED PROVIDER NOTES
Encounter Date: 2025       History     Chief Complaint   Patient presents with    Right Knee Pain Without Injury     Patient presents to the ED with complaints of right knee pain that started 3 days ago, denies any injury.     The history is provided by the patient.     Review of patient's allergies indicates:   Allergen Reactions    Cefprozil      Past Medical History:   Diagnosis Date    Anxiety     Depression      Past Surgical History:   Procedure Laterality Date     SECTION      3  c/s     Family History   Problem Relation Name Age of Onset    Hypertension Maternal Grandfather Muscular dys     Hypertension Father       Social History[1]  Review of Systems   Constitutional: Negative.    Respiratory: Negative.     Cardiovascular: Negative.    Musculoskeletal: Negative.         Right knee pain   Skin: Negative.    Neurological: Negative.    Psychiatric/Behavioral: Negative.     All other systems reviewed and are negative.      Physical Exam     Initial Vitals [25 0723]   BP Pulse Resp Temp SpO2   106/76 (!) 56 15 98.2 °F (36.8 °C) 100 %      MAP       --         Physical Exam    Vitals reviewed.  Constitutional: She appears well-developed and well-nourished.   Cardiovascular:  Normal rate, regular rhythm, normal heart sounds and intact distal pulses.           Pulmonary/Chest: Breath sounds normal.   Musculoskeletal:         General: Tenderness (right knee) present. Normal range of motion.     Neurological: She is alert and oriented to person, place, and time. She has normal strength. GCS score is 15. GCS eye subscore is 4. GCS verbal subscore is 5. GCS motor subscore is 6.   Skin: Skin is warm and dry. Capillary refill takes less than 2 seconds.   Psychiatric: She has a normal mood and affect. Her behavior is normal. Judgment and thought content normal.         Medical Screening Exam   See Full Note    ED Course   Procedures  Labs Reviewed   HCG QUALITATIVE URINE          Imaging Results               X-Ray Knee 1 or 2 View Right (Final result)  Result time 07/07/25 08:28:37      Final result by Gurinder English MD (07/07/25 08:28:37)                   Impression:      No acute displaced fracture.      Electronically signed by: Gurinder English MD  Date:    07/07/2025  Time:    08:28               Narrative:    EXAMINATION:  XR KNEE 1 OR 2 VIEW RIGHT    CLINICAL HISTORY:  pain;    TECHNIQUE:  AP and lateral views of the right knee were performed.    COMPARISON:  None.    FINDINGS:  No acute displaced fracture.  No dislocation.  Mild prepatellar soft tissue edema.  No sizeable joint effusion.  No unexpected radiopaque foreign body.                                       Medications   ketorolac injection 15 mg (15 mg Intramuscular Given 7/7/25 0846)     Medical Decision Making  MDM    Patient presents for emergent evaluation of acute right knee pain that poses a threat to life and/or bodily function.    In the ED patient found to have acute right knee sprain.    I ordered X-rays and personally reviewed them and reviewed the radiologist interpretation.  Right knee Xray significant for no acute abnormality.       Discharge MDM  I discussed the treatment and discharge plan with the patient.   Patient was managed in the ED with IM Toradol.    The response to treatment was good.    Patient was discharged in stable condition.  Detailed return precautions discussed.    Amount and/or Complexity of Data Reviewed  Radiology: ordered.    Risk  Prescription drug management.                                      Clinical Impression:   Final diagnoses:  [S83.91XA] Sprain of right knee, unspecified ligament, initial encounter (Primary)        ED Disposition Condition    Discharge Stable          ED Prescriptions    None       Follow-up Information       Follow up With Specialties Details Why Contact Info    Sara Bhatia FNP Family Medicine In 1 week  [address] [facility]  [facility] [place]  [address]  [phone]                 [1]   Social History  Tobacco Use    Smoking status: Former     Types: Vaping with nicotine     Passive exposure: Current    Smokeless tobacco: Never    Tobacco comments:     Stopped 5 days ago   Substance Use Topics    Alcohol use: Never    Drug use: Never        Shayy Christine, [facility]  07/07/25 0856

## 2025-07-08 ENCOUNTER — PATIENT MESSAGE (OUTPATIENT)
Dept: FAMILY MEDICINE | Facility: CLINIC | Age: 30
End: 2025-07-08
Payer: MEDICAID

## 2025-07-08 ENCOUNTER — OFFICE VISIT (OUTPATIENT)
Dept: FAMILY MEDICINE | Facility: CLINIC | Age: 30
End: 2025-07-08
Payer: MEDICAID

## 2025-07-08 VITALS
OXYGEN SATURATION: 100 % | SYSTOLIC BLOOD PRESSURE: 107 MMHG | BODY MASS INDEX: 26.54 KG/M2 | TEMPERATURE: 98 F | WEIGHT: 135.19 LBS | HEIGHT: 60 IN | HEART RATE: 54 BPM | DIASTOLIC BLOOD PRESSURE: 72 MMHG

## 2025-07-08 DIAGNOSIS — Z82.69 FAMILY HISTORY OF SYSTEMIC LUPUS ERYTHEMATOSUS: Primary | ICD-10-CM

## 2025-07-08 DIAGNOSIS — Z82.61 FAMILY HISTORY OF RHEUMATOID ARTHRITIS: ICD-10-CM

## 2025-07-08 DIAGNOSIS — M25.50 ARTHRALGIA, UNSPECIFIED JOINT: ICD-10-CM

## 2025-07-08 LAB
ALBUMIN SERPL BCP-MCNC: 3.7 G/DL (ref 3.5–5)
ALBUMIN/GLOB SERPL: 1.2 {RATIO}
ALP SERPL-CCNC: 64 U/L (ref 40–150)
ALT SERPL W P-5'-P-CCNC: 8 U/L
ANION GAP SERPL CALCULATED.3IONS-SCNC: 11 MMOL/L (ref 7–16)
AST SERPL W P-5'-P-CCNC: 13 U/L (ref 11–45)
BASOPHILS # BLD AUTO: 0.01 K/UL (ref 0–0.2)
BASOPHILS NFR BLD AUTO: 0.2 % (ref 0–1)
BILIRUB SERPL-MCNC: 0.2 MG/DL
BUN SERPL-MCNC: 13 MG/DL (ref 7–19)
BUN/CREAT SERPL: 17 (ref 6–20)
CALCIUM SERPL-MCNC: 8.7 MG/DL (ref 8.4–10.2)
CHLORIDE SERPL-SCNC: 107 MMOL/L (ref 98–107)
CO2 SERPL-SCNC: 27 MMOL/L (ref 22–29)
CREAT SERPL-MCNC: 0.75 MG/DL (ref 0.55–1.02)
CRP SERPL HS-MCNC: 12.85 MG/L
DIFFERENTIAL METHOD BLD: ABNORMAL
EGFR (NO RACE VARIABLE) (RUSH/TITUS): 110 ML/MIN/1.73M2
EOSINOPHIL # BLD AUTO: 0.1 K/UL (ref 0–0.5)
EOSINOPHIL NFR BLD AUTO: 1.7 % (ref 1–4)
ERYTHROCYTE [DISTWIDTH] IN BLOOD BY AUTOMATED COUNT: 20.4 % (ref 11.5–14.5)
ERYTHROCYTE [SEDIMENTATION RATE] IN BLOOD BY WESTERGREN METHOD: 14 MM/HR (ref 0–20)
GLOBULIN SER-MCNC: 3.1 G/DL (ref 2–4)
GLUCOSE SERPL-MCNC: 100 MG/DL (ref 74–100)
HCT VFR BLD AUTO: 38.8 % (ref 38–47)
HGB BLD-MCNC: 11.2 G/DL (ref 12–16)
IMM GRANULOCYTES # BLD AUTO: 0.02 K/UL (ref 0–0.04)
IMM GRANULOCYTES NFR BLD: 0.3 % (ref 0–0.4)
LYMPHOCYTES # BLD AUTO: 1.27 K/UL (ref 1–4.8)
LYMPHOCYTES NFR BLD AUTO: 22.1 % (ref 27–41)
MCH RBC QN AUTO: 22.4 PG (ref 27–31)
MCHC RBC AUTO-ENTMCNC: 28.9 G/DL (ref 32–36)
MCV RBC AUTO: 77.6 FL (ref 80–96)
MONOCYTES # BLD AUTO: 0.38 K/UL (ref 0–0.8)
MONOCYTES NFR BLD AUTO: 6.6 % (ref 2–6)
MPC BLD CALC-MCNC: 11.3 FL (ref 9.4–12.4)
NEUTROPHILS # BLD AUTO: 3.97 K/UL (ref 1.8–7.7)
NEUTROPHILS NFR BLD AUTO: 69.1 % (ref 53–65)
NRBC # BLD AUTO: 0 X10E3/UL
NRBC, AUTO (.00): 0 %
PLATELET # BLD AUTO: 214 K/UL (ref 150–400)
POTASSIUM SERPL-SCNC: 4.1 MMOL/L (ref 3.5–5.1)
PROT SERPL-MCNC: 6.8 G/DL (ref 6.4–8.3)
RBC # BLD AUTO: 5 M/UL (ref 4.2–5.4)
RHEUMATOID FACT SER NEPH-ACNC: NEGATIVE [IU]/ML
SODIUM SERPL-SCNC: 141 MMOL/L (ref 136–145)
WBC # BLD AUTO: 5.75 K/UL (ref 4.5–11)

## 2025-07-08 PROCEDURE — 3074F SYST BP LT 130 MM HG: CPT | Mod: CPTII,,, | Performed by: NURSE PRACTITIONER

## 2025-07-08 PROCEDURE — 85651 RBC SED RATE NONAUTOMATED: CPT | Mod: ,,, | Performed by: CLINICAL MEDICAL LABORATORY

## 2025-07-08 PROCEDURE — 1160F RVW MEDS BY RX/DR IN RCRD: CPT | Mod: CPTII,,, | Performed by: NURSE PRACTITIONER

## 2025-07-08 PROCEDURE — 86430 RHEUMATOID FACTOR TEST QUAL: CPT | Mod: ,,, | Performed by: CLINICAL MEDICAL LABORATORY

## 2025-07-08 PROCEDURE — 3008F BODY MASS INDEX DOCD: CPT | Mod: CPTII,,, | Performed by: NURSE PRACTITIONER

## 2025-07-08 PROCEDURE — 3078F DIAST BP <80 MM HG: CPT | Mod: CPTII,,, | Performed by: NURSE PRACTITIONER

## 2025-07-08 PROCEDURE — 86141 C-REACTIVE PROTEIN HS: CPT | Mod: ,,, | Performed by: CLINICAL MEDICAL LABORATORY

## 2025-07-08 PROCEDURE — 85025 COMPLETE CBC W/AUTO DIFF WBC: CPT | Mod: ,,, | Performed by: CLINICAL MEDICAL LABORATORY

## 2025-07-08 PROCEDURE — 1159F MED LIST DOCD IN RCRD: CPT | Mod: CPTII,,, | Performed by: NURSE PRACTITIONER

## 2025-07-08 PROCEDURE — 99213 OFFICE O/P EST LOW 20 MIN: CPT | Mod: ,,, | Performed by: NURSE PRACTITIONER

## 2025-07-08 PROCEDURE — 3044F HG A1C LEVEL LT 7.0%: CPT | Mod: CPTII,,, | Performed by: NURSE PRACTITIONER

## 2025-07-08 PROCEDURE — 80053 COMPREHEN METABOLIC PANEL: CPT | Mod: ,,, | Performed by: CLINICAL MEDICAL LABORATORY

## 2025-07-08 NOTE — PATIENT INSTRUCTIONS
Lab obtained in clinic today, we will notify you of results and any necessary changes to plan of care     You can take over the counter ibuprofen or naproxen as needed until we get results of lab

## 2025-07-08 NOTE — PROGRESS NOTES
Clinic note     Patient name: Seble Real is a 29 y.o. female   Chief compliant   Chief Complaint   Patient presents with    Joint Pain     Started about a week ago both hand and fingers with redness noted, left elbow, right knee, left ankle both shoulders.        Subjective     History of present illness   In clinic for evaluation of joint pain that started just before July 4th holiday; symptoms started suddenly, no known  injury or trauma   She reports pain and tenderness to joints of bilateral hands, bilateral knees, right elbow and left ankle   She denies any known fever, tick bite, headache, skin rash     Past Medical History: anxiety, depression    Family history of RA and lupus in paternal grandmother         Social History[1]    Review of patient's allergies indicates:   Allergen Reactions    Cefprozil        Past Medical History:   Diagnosis Date    Anxiety     Depression        Past Surgical History:   Procedure Laterality Date     SECTION      3  c/s        Family History   Problem Relation Name Age of Onset    Hypertension Father N/A     Hypertension Maternal Grandfather Muscular dys     Lupus Paternal Grandmother      Rheum arthritis Paternal Grandmother         Current Medications[2]    Review of Systems   Constitutional:  Negative for appetite change, chills, fatigue, fever and unexpected weight change.   Respiratory:  Negative for cough and shortness of breath.    Cardiovascular:  Negative for chest pain, palpitations and leg swelling.   Gastrointestinal:  Negative for abdominal pain, change in bowel habit, constipation, diarrhea, nausea and vomiting.   Genitourinary:  Negative for dysuria and frequency.   Musculoskeletal:  Positive for arthralgias. Negative for gait problem and myalgias.   Neurological:  Negative for dizziness, syncope, light-headedness and headaches.   Psychiatric/Behavioral:  Negative for dysphoric mood and sleep disturbance. The patient is not nervous/anxious.         Objective     /72 (Patient Position: Sitting)   Pulse (!) 54   Temp 98.2 °F (36.8 °C)   Ht 5' (1.524 m)   Wt 61.3 kg (135 lb 3.2 oz)   LMP 08/26/2024 (Approximate)   SpO2 100%   BMI 26.40 kg/m²     Physical Exam   Constitutional: She is oriented to person, place, and time. No distress.   HENT:   Head: Atraumatic.   Mouth/Throat: Mucous membranes are moist.   Cardiovascular: Normal rate and regular rhythm. Pulmonary:      Effort: Pulmonary effort is normal. No respiratory distress.      Breath sounds: Normal breath sounds. No wheezing, rhonchi or rales.     Abdominal: Soft. Bowel sounds are normal. She exhibits no distension. There is no abdominal tenderness.   Musculoskeletal:         General: Normal range of motion.      Right elbow: Tenderness present.      Right hand: Swelling, tenderness and bony tenderness present. Normal capillary refill. Normal pulse.      Left hand: Swelling, tenderness and bony tenderness present. Normal capillary refill. Normal pulse.      Cervical back: Neck supple.      Right knee: Tenderness present.      Left knee: Tenderness present.      Right lower leg: No edema.      Left lower leg: No edema.      Left ankle: Tenderness present.      Comments: Erythema and tenderness over joints of fingers/bilateral hands    Neurological: She is alert and oriented to person, place, and time. Gait normal.   Skin: Skin is warm and dry.   Psychiatric: Her behavior is normal. Mood normal.       Lab Results   Component Value Date    WBC 4.71 04/26/2024    HGB 12.9 04/26/2024    HCT 41.0 04/26/2024    MCV 83.7 04/26/2024     04/26/2024       CMP  Sodium   Date Value Ref Range Status   04/26/2024 142 136 - 145 mmol/L Final     Potassium   Date Value Ref Range Status   04/26/2024 3.8 3.5 - 5.1 mmol/L Final     Chloride   Date Value Ref Range Status   04/26/2024 109 (H) 98 - 107 mmol/L Final     CO2   Date Value Ref Range Status   04/26/2024 26 21 - 32 mmol/L Final     Glucose  "  Date Value Ref Range Status   04/26/2024 66 (L) 74 - 106 mg/dL Final     BUN   Date Value Ref Range Status   04/26/2024 16 7 - 18 mg/dL Final     Creatinine   Date Value Ref Range Status   04/26/2024 0.68 0.55 - 1.02 mg/dL Final     Calcium   Date Value Ref Range Status   04/26/2024 8.7 8.5 - 10.1 mg/dL Final     Total Protein   Date Value Ref Range Status   04/26/2024 7.1 6.4 - 8.2 g/dL Final     Albumin   Date Value Ref Range Status   04/26/2024 4.2 3.5 - 5.0 g/dL Final     Bilirubin, Total   Date Value Ref Range Status   04/26/2024 0.4 >0.0 - 1.2 mg/dL Final     Alk Phos   Date Value Ref Range Status   04/26/2024 83 37 - 98 U/L Final     AST   Date Value Ref Range Status   04/26/2024 19 15 - 37 U/L Final     ALT   Date Value Ref Range Status   04/26/2024 20 13 - 56 U/L Final     Anion Gap   Date Value Ref Range Status   04/26/2024 11 7 - 16 mmol/L Final     Lab Results   Component Value Date    TSH 0.761 04/26/2024     Lab Results   Component Value Date    CHOL 165 03/09/2023    CHOL 151 03/08/2022     Lab Results   Component Value Date    HDL 60 03/09/2023    HDL 47 03/08/2022     Lab Results   Component Value Date    LDLCALC 94 03/09/2023    LDLCALC 89 03/08/2022     Lab Results   Component Value Date    TRIG 56 03/09/2023    TRIG 75 03/08/2022     Lab Results   Component Value Date    CHOLHDL 2.8 03/09/2023    CHOLHDL 3.2 03/08/2022     Lab Results   Component Value Date    HGBA1C 5.2 03/24/2025       No results found for: "[id number]", "FLUAMOLEC", "FLUBMOLEC", "MOLSTREPAPOC"  Any diagnostic testing results obtained in office or prior to appointment were reviewed were reviewed with patient.    Health Maintenance Due   Topic Date Due    HIV Screening  Never done    TETANUS VACCINE  09/24/2017    COVID-19 Vaccine (1 - 2024-25 season) Never done    Cervical Cancer Screening  02/06/2025         Health Maintenance Topics with due status: Not Due       Topic Last Completion Date    Influenza Vaccine 10/21/2024 "    RSV Vaccine (Age 60+ and Pregnant patients) Not Due         Assessment and Plan   Family history of systemic lupus erythematosus  -     Rheumatoid Factor Screen; Future; Expected date: 07/08/2025  -     TITO EIA w/ Reflex to dsDNA/VIDHYA; Future; Expected date: 07/08/2025  -     Sedimentation Rate; Future; Expected date: 07/08/2025  -     CRP, High Sensitivity; Future; Expected date: 07/08/2025  -     Cyclic Citrullinated Peptide Antibody, IgG; Future; Expected date: 07/08/2025    Family history of rheumatoid arthritis  -     Rheumatoid Factor Screen; Future; Expected date: 07/08/2025  -     TITO EIA w/ Reflex to dsDNA/VIDHYA; Future; Expected date: 07/08/2025  -     Sedimentation Rate; Future; Expected date: 07/08/2025  -     CRP, High Sensitivity; Future; Expected date: 07/08/2025  -     Cyclic Citrullinated Peptide Antibody, IgG; Future; Expected date: 07/08/2025    Arthralgia, unspecified joint  -     Rheumatoid Factor Screen; Future; Expected date: 07/08/2025  -     TITO EIA w/ Reflex to dsDNA/VIDHYA; Future; Expected date: 07/08/2025  -     Sedimentation Rate; Future; Expected date: 07/08/2025  -     CRP, High Sensitivity; Future; Expected date: 07/08/2025  -     Cyclic Citrullinated Peptide Antibody, IgG; Future; Expected date: 07/08/2025  -     Lyme Antibody w/ Immunoblot Reflex; Future; Expected date: 07/08/2025  -     Spotted Fever Group Antibodies; Future; Expected date: 07/08/2025  -     CBC Auto Differential; Future; Expected date: 07/08/2025  -     Comprehensive Metabolic Panel; Future; Expected date: 07/08/2025          Patient Instructions  Patient Instructions   Lab obtained in clinic today, we will notify you of results and any necessary changes to plan of care     You can take over the counter ibuprofen or naproxen as needed until we get results of lab                                      [1]   Social History  Tobacco Use    Smoking status: Former     Types: Vaping with nicotine     Passive exposure:  Current    Smokeless tobacco: Never    Tobacco comments:     Stopped 5 days ago   Substance Use Topics    Alcohol use: Never    Drug use: Never   [2]   Current Outpatient Medications:     sertraline (ZOLOFT) 50 MG tablet, Take 1 tablet (50 mg total) by mouth once daily., Disp: 90 tablet, Rfl: 0

## 2025-07-09 LAB — B BURGDOR IGG SER QL IB: NORMAL

## 2025-07-10 LAB
ANA SER QL: NEGATIVE
CCP AB SER IA-ACNC: <16 UNITS

## 2025-07-11 ENCOUNTER — PATIENT MESSAGE (OUTPATIENT)
Dept: FAMILY MEDICINE | Facility: CLINIC | Age: 30
End: 2025-07-11
Payer: MEDICAID

## 2025-07-11 DIAGNOSIS — M25.50 ARTHRALGIA, UNSPECIFIED JOINT: Primary | ICD-10-CM

## 2025-07-11 LAB
RICK SF IGG TITR SER IF: ABNORMAL {TITER}
RICK SF IGM TITR SER IF: ABNORMAL {TITER}

## 2025-07-11 RX ORDER — METHYLPREDNISOLONE 4 MG/1
TABLET ORAL
Qty: 21 EACH | Refills: 0 | Status: SHIPPED | OUTPATIENT
Start: 2025-07-11 | End: 2025-08-01

## 2025-07-11 NOTE — TELEPHONE ENCOUNTER
Talked with PCP about pt labs. Waiting on spotted fever antibodies. CRP is elevated. She is going to send in a medrol dose pack for pt. Once we get spotted fever lab possible referral to rheumatology. PCP states to notify pt.

## 2025-07-21 DIAGNOSIS — M25.50 ARTHRALGIA, UNSPECIFIED JOINT: ICD-10-CM

## 2025-07-21 DIAGNOSIS — R79.82 ELEVATED C-REACTIVE PROTEIN (CRP): Primary | ICD-10-CM

## 2025-07-21 DIAGNOSIS — Z82.61 FAMILY HISTORY OF RHEUMATOID ARTHRITIS: ICD-10-CM

## 2025-07-23 DIAGNOSIS — Z82.61 FAMILY HISTORY OF RHEUMATOID ARTHRITIS: ICD-10-CM

## 2025-07-23 DIAGNOSIS — M25.50 ARTHRALGIA, UNSPECIFIED JOINT: ICD-10-CM

## 2025-07-23 DIAGNOSIS — R79.82 ELEVATED C-REACTIVE PROTEIN (CRP): Primary | ICD-10-CM

## 2025-08-04 ENCOUNTER — PATIENT MESSAGE (OUTPATIENT)
Dept: FAMILY MEDICINE | Facility: CLINIC | Age: 30
End: 2025-08-04
Payer: MEDICAID